# Patient Record
Sex: FEMALE
[De-identification: names, ages, dates, MRNs, and addresses within clinical notes are randomized per-mention and may not be internally consistent; named-entity substitution may affect disease eponyms.]

---

## 2017-08-13 ENCOUNTER — HOSPITAL ENCOUNTER (OUTPATIENT)
Dept: HOSPITAL 42 - ED | Age: 77
Setting detail: OBSERVATION
LOS: 1 days | Discharge: HOME | End: 2017-08-14
Attending: FAMILY MEDICINE | Admitting: FAMILY MEDICINE
Payer: MEDICARE

## 2017-08-13 VITALS — BODY MASS INDEX: 22.2 KG/M2 | HEART RATE: 71 BPM

## 2017-08-13 VITALS — RESPIRATION RATE: 20 BRPM

## 2017-08-13 DIAGNOSIS — D64.9: ICD-10-CM

## 2017-08-13 DIAGNOSIS — I42.0: ICD-10-CM

## 2017-08-13 DIAGNOSIS — Z90.710: ICD-10-CM

## 2017-08-13 DIAGNOSIS — Z88.2: ICD-10-CM

## 2017-08-13 DIAGNOSIS — I47.2: Primary | ICD-10-CM

## 2017-08-13 DIAGNOSIS — Z95.810: ICD-10-CM

## 2017-08-13 DIAGNOSIS — I49.01: ICD-10-CM

## 2017-08-13 DIAGNOSIS — Z88.1: ICD-10-CM

## 2017-08-13 DIAGNOSIS — I11.0: ICD-10-CM

## 2017-08-13 DIAGNOSIS — Z87.01: ICD-10-CM

## 2017-08-13 DIAGNOSIS — Z95.0: ICD-10-CM

## 2017-08-13 DIAGNOSIS — Z88.8: ICD-10-CM

## 2017-08-13 DIAGNOSIS — Z79.82: ICD-10-CM

## 2017-08-13 DIAGNOSIS — I50.9: ICD-10-CM

## 2017-08-13 DIAGNOSIS — K59.00: ICD-10-CM

## 2017-08-13 DIAGNOSIS — R55: ICD-10-CM

## 2017-08-13 DIAGNOSIS — I48.0: ICD-10-CM

## 2017-08-13 DIAGNOSIS — I27.2: ICD-10-CM

## 2017-08-13 LAB
ALBUMIN SERPL-MCNC: 4.2 G/DL (ref 3–4.8)
ALBUMIN/GLOB SERPL: 1.4 {RATIO} (ref 1.1–1.8)
ALT SERPL-CCNC: 40 U/L (ref 7–56)
APTT BLD: 31.7 SECONDS (ref 23.7–30.8)
AST SERPL-CCNC: 34 U/L (ref 15–39)
BASOPHILS # BLD AUTO: 0.05 K/MM3 (ref 0–2)
BASOPHILS NFR BLD: 0.8 % (ref 0–3)
BUN SERPL-MCNC: 24 MG/DL (ref 7–21)
CALCIUM SERPL-MCNC: 9.6 MG/DL (ref 8.4–10.5)
EOSINOPHIL # BLD: 0.2 10*3/UL (ref 0–0.7)
EOSINOPHIL NFR BLD: 4 % (ref 1.5–5)
ERYTHROCYTE [DISTWIDTH] IN BLOOD BY AUTOMATED COUNT: 23.5 % (ref 11.5–14.5)
GFR NON-AFRICAN AMERICAN: 44
GRANULOCYTES # BLD: 4.56 10*3/UL (ref 1.4–6.5)
GRANULOCYTES NFR BLD: 76.7 % (ref 50–68)
HGB BLD-MCNC: 11 G/DL (ref 12–16)
INR PPP: 2.21 (ref 0.93–1.08)
LYMPHOCYTES # BLD: 0.6 10*3/UL (ref 1.2–3.4)
LYMPHOCYTES NFR BLD AUTO: 10.4 % (ref 22–35)
MAGNESIUM SERPL-MCNC: 2.4 MG/DL (ref 1.7–2.2)
MCH RBC QN AUTO: 22.6 PG (ref 25–35)
MCHC RBC AUTO-ENTMCNC: 30.8 G/DL (ref 31–37)
MCV RBC AUTO: 73.5 FL (ref 80–105)
MONOCYTES # BLD AUTO: 0.5 10*3/UL (ref 0.1–0.6)
MONOCYTES NFR BLD: 8.1 % (ref 1–6)
PLATELET # BLD: 274 10^3/UL (ref 120–450)
PMV BLD AUTO: 8.6 FL (ref 7–11)
PROTHROMBIN TIME: 23.9 SECONDS (ref 9.9–11.8)
RBC # BLD AUTO: 4.86 10^6/UL (ref 3.5–6.1)
TROPONIN I SERPL-MCNC: 0.02 NG/ML
WBC # BLD AUTO: 6 10^3/UL (ref 4.5–11)

## 2017-08-13 PROCEDURE — 99285 EMERGENCY DEPT VISIT HI MDM: CPT

## 2017-08-13 PROCEDURE — 85027 COMPLETE CBC AUTOMATED: CPT

## 2017-08-13 PROCEDURE — 85025 COMPLETE CBC W/AUTO DIFF WBC: CPT

## 2017-08-13 PROCEDURE — 80053 COMPREHEN METABOLIC PANEL: CPT

## 2017-08-13 PROCEDURE — 93005 ELECTROCARDIOGRAM TRACING: CPT

## 2017-08-13 PROCEDURE — 70450 CT HEAD/BRAIN W/O DYE: CPT

## 2017-08-13 PROCEDURE — 71010: CPT

## 2017-08-13 PROCEDURE — 84484 ASSAY OF TROPONIN QUANT: CPT

## 2017-08-13 PROCEDURE — 82550 ASSAY OF CK (CPK): CPT

## 2017-08-13 PROCEDURE — 83735 ASSAY OF MAGNESIUM: CPT

## 2017-08-13 PROCEDURE — 36415 COLL VENOUS BLD VENIPUNCTURE: CPT

## 2017-08-13 PROCEDURE — 81001 URINALYSIS AUTO W/SCOPE: CPT

## 2017-08-13 PROCEDURE — 85730 THROMBOPLASTIN TIME PARTIAL: CPT

## 2017-08-13 PROCEDURE — 85610 PROTHROMBIN TIME: CPT

## 2017-08-13 PROCEDURE — 83615 LACTATE (LD) (LDH) ENZYME: CPT

## 2017-08-13 PROCEDURE — 80162 ASSAY OF DIGOXIN TOTAL: CPT

## 2017-08-13 RX ADMIN — MILRINONE LACTATE PRN MLS/HR: 200 INJECTION, SOLUTION INTRAVENOUS at 18:21

## 2017-08-13 NOTE — CARD
--------------- APPROVED REPORT --------------





EKG Measurement

Heart [id number]

AL 128P

[id number]

[id number]

[id number]



<Conclusion>



AV sequential or dual chamber electronic pacemaker

## 2017-08-13 NOTE — ED PDOC
Arrival/HPI





- General


Chief Complaint: Syncope


Time Seen by Provider: 08/13/17 12:54


Historian: Patient





- History of Present Illness


Narrative History of Present Illness (Text): 


08/13/17 13:00


A 77 year old female with a past medial history of congestive heart failure, 

hypertension, and on Coumadin, presents to the Emergency department brought via 

EMS after a syncopal episode at her [employer]. The patient states that while she 

was standing she felt dizzy and had to sit down, and "blacked out" She notes 

that she recalls being surrounded by people at [employer]. The patient stats that 

she is asymptomatic at the moment. The patient denies headache, fevers, chills, 

chest pain, abdominal pain shortness of breath, nausea, vomiting, diarrhea, or 

any other complaint. 





PMD: Dr. Shantel Ventura 


08/13/17 17:00





Time/Duration: Prior to Arrival


Symptom Onset: Sudden


Symptom Course: Improving


Activities at Onset: Rest, Light


Context: Other (Jewish)





Past Medical History





- Provider Review


Nursing Documentation Reviewed: Yes





- Infectious Disease


Hx of Infectious Diseases: None





- Tetanus Immunization


Tetanus Immunization: Unknown





- Cardiac


Hx Cardiac Disorders: Yes (CHF)


Hx Cardiac Arrhythmia: Yes (AFIB)


Hx Congestive Heart Failure: Yes


Hx Internal Defibrillator: Yes (2 years ago)


Hx Pacemaker: Yes (2 years ago)





- Pulmonary


Hx Respiratory Disorders: Yes


Hx Pneumonia: Yes





- Neurological


Hx Neurological Disorder: No





- HEENT


Hx HEENT Disorder: No





- Renal


Hx Renal Disorder: No





- Endocrine/Metabolic


Hx Endocrine Disorders: No





- Hematological/Oncological


Hx Blood Disorders: No


Hx Anemia: Yes (HAD BLOOD TRANFUSION)





- Integumentary


Hx Dermatological Disorder: No





- Musculoskeletal/Rheumatological


Hx Musculoskeletal Disorders: No


Hx Falls: No





- Gastrointestinal


Hx Gastrointestinal Disorders: No





- Genitourinary/Gynecological


Hx Genitourinary Disorders: No





- Psychiatric


Hx Psychophysiologic Disorder: No


Hx Depression: No


Hx Emotional Abuse: No


Hx Physical Abuse: No


Hx Substance Use: No





- Surgical History


Hx Hysterectomy: Yes (AT AGE 39 YEARS AGO)


Other/Comment: HYSTERECTOMY.PACEMAKER,DEFIBRILLATOR





- Anesthesia


Hx Anesthesia: Yes


Hx Anesthesia Reactions: No


Hx Malignant Hyperthermia: No





- Suicidal Assessment


Feels Threatened In Home Enviroment: No





Family/Social History





- Physician Review


Nursing Documentation Reviewed: Yes


Family/Social History: No Known Family HX


Smoking Status: Never Smoked


Hx Alcohol Use: No


Hx Substance Use: No


Hx Substance Use Treatment: No





Allergies/Home Meds


Allergies/Adverse Reactions: 


Allergies





iodine Allergy (Verified 02/11/16 20:14)


 RASH


levofloxacin Allergy (Verified 02/11/16 20:14)


 RASH


sulfur [From Sulfur-8] Allergy (Verified 02/11/16 20:14)


 RASH








Home Medications: 


 Home Meds











 Medication  Instructions  Recorded  Confirmed


 


Digoxin 0.125 mg PO DAILY 10/22/12 08/13/17


 


Furosemide 40 mg PO QAM 10/22/12 08/13/17


 


Sotalol Hydrochloride [Sotalol] 80 mg PO BID 10/22/12 08/13/17


 


Furosemide [Lasix] 40 mg PO HS 08/25/14 08/13/17


 


Aspirin [Adult Low Dose Aspirin EC] 120 mg PO DAILY 02/11/16 08/13/17














Review of Systems





- Physician Review


All systems were reviewed & negative as marked: Yes





- Review of Systems


Constitutional: absent: Fevers, Night Sweats


Respiratory: absent: SOB


Cardiovascular: absent: Chest Pain


Gastrointestinal: absent: Abdominal Pain, Diarrhea, Nausea, Vomiting


Neurological: Dizziness, Other (Syncopal Episode).  absent: Headache





Physical Exam


Vital Signs











  Temp Pulse Resp BP Pulse Ox


 


 08/13/17 16:40   70  18  96/60 L  99


 


 08/13/17 15:07   70  15  91/56 L  100


 


 08/13/17 15:03  97.6 F  70  18  85/46 L 


 


 08/13/17 13:58  97.6 F  70   85/46 L  100


 


 08/13/17 12:55   70  18  92/45 L  99











Appearance: Positive for: Well-Appearing, Non-Toxic, Comfortable


Pain Distress: None


Mental Status: Positive for: Alert and Oriented X 3





- Systems Exam


Head: Present: Atraumatic, Normocephalic


Pupils: Present: PERRL


Extroacular Muscles: Present: EOMI


Conjunctiva: Present: Normal


Mouth: Present: Moist Mucous Membranes


Neck: Present: Normal Range of Motion


Respiratory/Chest: Present: Clear to Auscultation, Good Air Exchange.  No: 

Respiratory Distress, Accessory Muscle Use


Cardiovascular: Present: Regular Rate and Rhythm, Normal S1, S2.  No: Murmurs


Abdomen: Present: Normal Bowel Sounds.  No: Tenderness, Distention, Peritoneal 

Signs


Back: Present: Normal Inspection


Upper Extremity: Present: Normal Inspection.  No: Cyanosis, Edema


Lower Extremity: Present: Normal Inspection.  No: Edema


Neurological: Present: GCS=15, CN II-XII Intact, Speech Normal


Skin: Present: Warm, Dry, Normal Color.  No: Rashes


Psychiatric: Present: Alert, Oriented x 3, Normal Insight, Normal Concentration





Medical Decision Making


ED Course and Treatment: 


08/13/17 13:15


Impression:


A 77 year old female with an episode of dizziness and syncope prior to arrival. 

r/o cardiac metabolic intracrnial etiology





Plan:


-- EKG


-- Head CT


-- Chest X-ray


-- Urinalysis 


-- Labs 


-- Reassess and disposition





Prior Visits:


Notes and results from previous visits were reviewed.





Progress Notes:


EKG:


Ordered, reviewed, and independently interpreted the EKG.


Rate : 70 BPM


Rhythm : Paced





CHEST X-RAY 


Dictator : Db Liao MD


Report Date : 08/13/2017 13:51:50


IMPRESSION: No active disease. No significant interval change compared to the 

prior examination(s).





CT HEAD WITHOUT CONTRAST


Dictator : Db Liao MD


Report Date : 08/13/2017 14:10:07


IMPRESSION: No acute intracranial abnormalities. No significant findings to 

account for the clinical presentation.





08/13/17 14:14


Pt remains well appearing, smiling in and in ER. Neuro intact. will admit for 

syncope, r/o cardiac etiology. Dr. Pemberton accepts. [place] syncope 

positive needs admission.











- Lab Interpretations


Lab Results: 








 08/13/17 13:25 





 08/13/17 13:25 





 Lab Results





08/13/17 13:25: Sodium 139, Potassium 4.5, Chloride 103, Carbon Dioxide 27, 

Anion Gap 14, BUN 24 H, Creatinine 1.2, Est GFR (African Amer) 53, Est GFR (Non-

Af Amer) 44, Random Glucose 113 H, Calcium 9.6, Magnesium 2.4 H, Total 

Bilirubin 0.6, AST 34, ALT 40, Alkaline Phosphatase 100, Lactate Dehydrogenase 

526, Total Creatine Kinase 83, Troponin I 0.02  D, Total Protein 7.2, Albumin 

4.2, Globulin 3.0, Albumin/Globulin Ratio 1.4


08/13/17 13:25: PT 23.9 H, INR 2.21 H, APTT 31.7 H


08/13/17 13:25: WBC 6.0, RBC 4.86, Hgb 11.0 L, Hct 35.7 L, MCV 73.5 L, MCH 22.6 

L, MCHC 30.8 L, RDW 23.5 H, Plt Count 274, MPV 8.6, Gran % 76.7 H, Lymph % (Auto

) 10.4 L, Mono % (Auto) 8.1 H, Eos % (Auto) 4.0, Baso % (Auto) 0.8, Gran # 4.56

, Lymph # 0.6 L, Mono # 0.5, Eos # 0.2, Baso # 0.05


08/13/17 13:25: Digoxin < 0.4 L








I have reviewed the lab results: Yes





- RAD Interpretation


Radiology Orders: 








08/13/17 13:02


HEAD W/O CONTRAST [CT] Stat 


CHEST PORTABLE [RAD] Stat 














- EKG Interpretation


Interpreted by ED Physician: Yes


Type: 12 lead EKG





- Medication Orders


Current Medication Orders: 








Amiodarone HCl (Cordarone)  200 mg PO DAILY [facility]


Carvedilol (Coreg)  3.125 mg PO BID [facility]


Home Med (Home Med)  1 unit PO DAILY [facility]


Milrinone Lactate/Dextrose (Primacor 20mg/100ml D5w)  100 mls @ 5.797 mls/hr IV 

.[id number] PRN; Protocol; 0.3 MCG/KG/MIN


   PRN Reason: TITRATE PER MD ORDER


Spironolactone (Aldactone)  12.5 mg PO BID [facility]











- Scribe Statement


The provider has reviewed the documentation as recorded by the Aure Ortiz





Provider Scribe Attestation:


All medical record entries made by the Carlitosibkendy were at my direction and 

personally dictated by me. I have reviewed the chart and agree that the record 

accurately reflects my personal performance of the history, physical exam, 

medical decision making, and the department course for this patient. I have 

also personally directed, reviewed, and agree with the discharge instructions 

and disposition.








Disposition/Present on Arrival





- Present on Arrival


Any Indicators Present on Arrival: No


History of DVT/PE: No


History of Uncontrolled Diabetes: No


Urinary Catheter: No


History of Decub. Ulcer: No


History Surgical Site Infection Following: None





- Disposition


Have Diagnosis and Disposition been Completed?: Yes


Diagnosis: 


 Syncope





Disposition: HOSPITALIZED


Disposition Time: 17:12


Condition: FAIR

## 2017-08-13 NOTE — RAD
HISTORY:

Syncope.



COMPARISON:

12/14/2016. 



FINDINGS:



LUNGS:

No active pulmonary disease.



PLEURA:

No significant pleural effusion identified, no pneumothorax apparent.



CARDIOVASCULAR:

Cardiomegaly.  No evidence of acute, significant cardiovascular 

disease. Position/ configuration of pacemaker

Satisfactory. PICC line in satisfactory position unchanged compared 

to prior study.



OSSEOUS STRUCTURES:

No significant abnormalities.



VISUALIZED UPPER ABDOMEN:

Normal.



OTHER FINDINGS:

None.



IMPRESSION:

No active disease. No significant interval change compared to the 

prior examination(s).

## 2017-08-13 NOTE — CT
PROCEDURE:  CT HEAD WITHOUT CONTRAST.



HISTORY:

Syncope. 



COMPARISON:

None available. 



TECHNIQUE:

Axial computed tomography images were obtained through the head/brain 

without intravenous contrast.  



Radiation dose:



Total exam DLP = 725.84 mGy-cm.



This CT exam was performed using one or more of the following dose 

reduction techniques: Automated exposure control, adjustment of the 

mA and/or kV according to patient size, and/or use of iterative 

reconstruction technique.



FINDINGS:



HEMORRHAGE:

No intracranial hemorrhage. 



BRAIN:

No mass effect or edema.  Cerebellar atrophy.



VENTRICLES:

Unremarkable. No hydrocephalus. 



CALVARIUM:

Unremarkable.



PARANASAL SINUSES:

Chronic ethmoid air cell disease.



MASTOID AIR CELLS:

Unremarkable as visualized. No inflammatory changes.



OTHER FINDINGS:

None.



IMPRESSION:

No acute intracranial abnormalities. No significant findings to 

account for the clinical presentation.

## 2017-08-14 VITALS — TEMPERATURE: 98.2 F | SYSTOLIC BLOOD PRESSURE: 109 MMHG | HEART RATE: 67 BPM | DIASTOLIC BLOOD PRESSURE: 60 MMHG

## 2017-08-14 VITALS — OXYGEN SATURATION: 93 %

## 2017-08-14 LAB
ALBUMIN SERPL-MCNC: 3.7 G/DL (ref 3–4.8)
ALBUMIN/GLOB SERPL: 1.3 {RATIO} (ref 1.1–1.8)
ALT SERPL-CCNC: 33 U/L (ref 7–56)
APPEARANCE UR: CLEAR
AST SERPL-CCNC: 32 U/L (ref 15–39)
BILIRUB UR-MCNC: NEGATIVE MG/DL
BUN SERPL-MCNC: 18 MG/DL (ref 7–21)
CALCIUM SERPL-MCNC: 8.9 MG/DL (ref 8.4–10.5)
COLOR UR: YELLOW
EPI CELLS #/AREA URNS HPF: (no result) /HPF (ref 0–5)
ERYTHROCYTE [DISTWIDTH] IN BLOOD BY AUTOMATED COUNT: 21.6 % (ref 11.5–14.5)
GFR NON-AFRICAN AMERICAN: > 60
GLUCOSE UR STRIP-MCNC: NEGATIVE MG/DL
HGB BLD-MCNC: 9.3 G/DL (ref 12–16)
LEUKOCYTE ESTERASE UR-ACNC: (no result) LEU/UL
MCH RBC QN AUTO: 22.5 PG (ref 25–35)
MCHC RBC AUTO-ENTMCNC: 30.4 G/DL (ref 31–37)
MCV RBC AUTO: 74.1 FL (ref 80–105)
PH UR STRIP: 6 [PH] (ref 4.7–8)
PLATELET # BLD: 237 10^3/UL (ref 120–450)
PMV BLD AUTO: 9.1 FL (ref 7–11)
PROT UR STRIP-MCNC: NEGATIVE MG/DL
RBC # BLD AUTO: 4.13 10^6/UL (ref 3.5–6.1)
RBC # UR STRIP: (no result) /UL
RBC #/AREA URNS HPF: (no result) /HPF (ref 0–2)
SP GR UR STRIP: 1.01 (ref 1–1.03)
URINE NITRATE: NEGATIVE
UROBILINOGEN UR STRIP-ACNC: 1 E.U./DL
WBC # BLD AUTO: 5.4 10^3/UL (ref 4.5–11)
WBC #/AREA URNS HPF: (no result) /HPF (ref 0–6)

## 2017-08-14 RX ADMIN — MILRINONE LACTATE PRN MLS/HR: 200 INJECTION, SOLUTION INTRAVENOUS at 08:01

## 2017-08-14 NOTE — CON
DATE:  08/14/2017



INDICATIONS:  Syncope.



HISTORY OF PRESENT ILLNESS:  This is a 77-year-old woman, well known to me,

admitted after a syncopal episode which occurred in [employer] yesterday

morning.  She felt like her vision was dimming, she felt weak and then

passed out briefly.  She was observed to shake.  She does not recall if got

a shock or not, but surmises that she must have because she woke up very

quickly.  There was chest pain, shortness of breath, orthopnea, PND,

vertigo, palpitation, edema, fever, chills, cough, sputum production,

hemoptysis, abdominal pain, nausea, vomiting, diarrhea, constipation or

melena.



PAST MEDICAL HISTORY:  Notable for dilated cardiomyopathy with class IV CHF

on home milrinone infusion and recently Entresto .  She is followed by Dr. Lugo at the [facility].

She has a defibrillator.  She has a history pulmonary hypertension,

paroxysmal atrial fibrillation, hypertension, pneumonia and a hysterectomy.

There is no history of rheumatic fever, myocardial infarction, angina,

diabetes, stroke, TIA or gout.

 

MEDICATIONS AT THE TIME OF ADMISSION:  Include aspirin, digoxin, Lasix,

warfarin, Coreg, amiodarone, and milrinone.  Recently milrinone has been

weaned slowly while she has been started on Entresto by Dr. Lugo.  She

currently takes Entresto 49/51 one tablet b.i.d.



ALLERGIES:  SHE NOTES ALLERGIES TO IODINE, LEVOFLOXACIN AND SULFA

MEDICATIONS.



SOCIAL HISTORY:  She lives at home.  She is ambulatory.  She is retired. 

She does not smoke cigarettes.  She does not drink alcohol.



FAMILY HISTORY:  Noncontributory.



REVIEW OF SYSTEMS:  Ten point review of systems otherwise unremarkable

except as noted above.



PHYSICAL EXAMINATION

GENERAL:  She is a well-developed woman, lying in bed on telemetry in no

acute distress.

VITAL SIGNS:  Notable for AV pacing at 72 to 81 beats per minute.  She is

afebrile.  Blood pressure 100/59, respirations 20, O2 saturation 93% to 99%

on room air.  Orthostatic blood pressures are normal.

HEENT:  Reveals moderate neck vein distention.  No thyromegaly.  No carotid

bruit.  Mucous membranes moist.  Conjunctivae pink.

NECK:  Supple.

LUNGS:  Fields clear throughout.

HEART:  Revealed normal 1st and 2nd heart sounds.  There is soft systolic

murmur along the left sternal border.  The PMI is displaced laterally.

ABDOMEN:  Soft.  Bowel sounds are present.  There is no mass, organomegaly,

tenderness, rebound or guarding.  No CVA tenderness.  No palpable abdominal

aortic aneurysm.

EXTREMITIES:  Revealed no cyanosis, clubbing or edema.

NEUROLOGIC:  She is awake, alert and oriented.

SKIN:  Warm and dry.  No rash or cellulitis.

PSYCHIATRIC:  Normal as to mood and affect.



LABORATORY DATA AND IMAGING:  A portable chest x-ray revealed no active

disease.  EKG reveals AV pacing.  A CT scan of the head reveals no acute

intracranial abnormality.  White count normal.  Platelet count normal. 

Hemoglobin 11, repeat 9.3.  Hematocrit 35.7, repeat 30.6.  PT 23.9.  INR

2.21.  Electrolytes, BUN, creatinine, blood sugar are unremarkable. 

Magnesium 2.4.  LFTs unremarkable.  CK 83, troponin 0.02.  Urinalysis is

noted, digoxin level less than 0.4.



IMPRESSION:  Crystal Roger is a 77-year-old woman with known dilated

cardiomyopathy, on home milrinone and p.o. Entresto with known paroxysmal

atrial fibrillation and an ICD in place, who experienced syncope while at

[employer].  This could have been an of episode of VT or VF terminated by the

defibrillator.  We will interrogate the defibrillator later today to

ascertain the rhythm at the time of her syncopal episode yesterday.  In the

meantime, she has been on telemetry, has had no further arrhythmias and she

feels well without recurrent syncope or dizziness or palpitations.



PLAN:  Based on the interrogation of defibrillator, we may increase her

amiodarone to 200 mg b.i.d.  In the meantime, we will continue her usual

medications including aspirin, spironolactone, amiodarone, Coreg, 

warfarin, digoxin, Lasix, and milrinone.  Since Entresto is not our

formulary, she can take her home medications at 49/51 mg one tablet b.i.d. 

Once the defibrillator is interrogated, we will plan for discharge probably

later today.  Outpatient followup in my office as well as with Dr. Lugo and 
Dr. Estrada (). 

She will report any further shocks or syncope to us promptly.





________________________________________

Balwinder Infante MD



DD:  08/14/2017 8:42:13

DT:  08/14/2017 11:02:26

Job # [id number]





SHERYL

## 2017-08-14 NOTE — HP
CHIEF COMPLAINT:  Syncope.



HISTORY OF PRESENT ILLNESS:  A 77-years-old female with history of dilated

cardiomyopathy with very low ejection fraction of 15%.  Currently on

chronic medications for congestive heart failure, which is Entresto and

milrinone .  The patient was in her good state of health.  She went to

[place] in the morning when she felt lightheaded and eventually became

diaphoretic and passed out.  The patient was brought to the emergency room.

She denied any chest pain, shortness of breath, fever, headache, or blurry

vision.



PAST MEDICAL HISTORY:  Dilated cardiomyopathy with congestive heart

failure, ejection fraction of 15% chronically on milrinone, Entresto.



SURGICAL HISTORY:  Significant for hysterectomy and pacemaker with

defibrillator insertion in 2011.



CURRENT MEDICATIONS:  Spironolactone, aspirin, furosemide, warfarin,

carvedilol, amiodarone, Entresto and milrinone.



ALLERGIES:  THE PATIENT HAS KNOWN ALLERGIES TO HEPARIN, IODINE,

LEVOFLOXACIN AND SULFA.



FAMILY HISTORY:  Significant for heart disease on father side.  The patient

has 2 children who are heathy.



SOCIAL HISTORY:  The patient denies any smoking or alcohol use.  The

patient denies any drug use.  She is a .  The patient lives with her

daughter.  The patient is ambulatory and independent with activities of

daily living.



REVIEW OF SYSTEMS:  The patent denies any fever, loss of appetite, loss of

weight.  She denies any sore throat, nasal congestion.  She denies any

dysphagia.  Denies any cough or shortness of breath.  She denies any heart

palpitation, chest pain.  She denies any leg edema.  The patient denies any

abdominal symptoms like nausea, vomiting, diarrhea, constipation.  She

denies any dysuria, hematuria or frequency.  Denies any neurological

symptoms as headache, blurred vision, numbness, weakness.  The patient

denies any depression symptoms or anxiety, but has chronic insomnia.



PHYSICAL EXAMINATION:

GENERAL:  She is comfortably bed alert, awake, and oriented.

VITAL SIGNS:  Stable, temperature this morning 97.9 and pulse 72 regular,

blood pressure 112/63, respiratory rate 20 and O2 saturation since

yesterday range of between 99% to 95%.

HEENT:  Head is normocephalic and atraumatic.  Oral mucosa is moist.

NECK:  Supple.  No neck mass, no JVD.

LUNGS:  Crackles in the base.  No rales, or rhonchi.

HEART:  With regular rate, rate of 70 per minute.

ABDOMEN:  Soft, nontender, nondistended.  No masses palpable.  Bowel sounds

positive.

EXTREMITIES:  No edema with full range of motion.

NEUROLOGICAL EXAM:  Normal, no sensory motor deficits.



DIAGNOSTIC TESTS:  CBC on admission showed normal. WBC 6, hemoglobin 11,

hematocrit 35.7.  Repeated CBC this morning showed stable WBC 5.4,

hemoglobin 9.3, hematocrit 30.6, platelet count 237.  Chemistry was normal

with random glucose 115, magnesium 2.4, albumin 124, creatinine 1.2.  This

morning BUN 18, creatinine 0.9.  Urinalysis showed trace of blood.  Her

chest x-ray showed no active disease.  EKG showed heart rate of 70 with

electronic pacemaker rhythm.  Head CT showed no intracerebral bleeding.



ASSESSMENT:

1.  A 77-year-old female with history of dilated cardiomyopathy and CHF

admitted for a syncopal episode most probably vasovagal reflex.

2.  Chronic dilated cardiomyopathy, hemodynamically stable with no acute

CHF.

3.  Mild anemia chronic with no signs of active bleeding.



PLAN OF TREATMENT:  The patient was admitted to telemetry for monitoring. 

Cardiologist was called in consult.  The patient was restarted on chronic

medications and the patient will be monitored, her pacemaker will be

checked.





__________________________________________

Shantel Franklin MD





DD:  08/14/2017 9:19:42

DT:  08/14/2017 12:47:24

Job # [id number]





MTDD

## 2017-12-30 ENCOUNTER — HOSPITAL ENCOUNTER (OUTPATIENT)
Dept: HOSPITAL 42 - ED | Age: 77
Setting detail: OBSERVATION
LOS: 1 days | Discharge: HOME | End: 2017-12-31
Attending: FAMILY MEDICINE | Admitting: FAMILY MEDICINE
Payer: MEDICARE

## 2017-12-30 VITALS — BODY MASS INDEX: 23 KG/M2 | HEART RATE: 71 BPM

## 2017-12-30 DIAGNOSIS — I48.0: ICD-10-CM

## 2017-12-30 DIAGNOSIS — I42.0: ICD-10-CM

## 2017-12-30 DIAGNOSIS — D50.9: ICD-10-CM

## 2017-12-30 DIAGNOSIS — Z95.810: ICD-10-CM

## 2017-12-30 DIAGNOSIS — I27.20: ICD-10-CM

## 2017-12-30 DIAGNOSIS — I11.0: Primary | ICD-10-CM

## 2017-12-30 DIAGNOSIS — Z90.710: ICD-10-CM

## 2017-12-30 DIAGNOSIS — Z87.01: ICD-10-CM

## 2017-12-30 DIAGNOSIS — I50.23: ICD-10-CM

## 2017-12-30 DIAGNOSIS — N28.9: ICD-10-CM

## 2017-12-30 LAB
ALBUMIN SERPL-MCNC: 4 G/DL (ref 3–4.8)
ALBUMIN/GLOB SERPL: 1.3 {RATIO} (ref 1.1–1.8)
ALT SERPL-CCNC: 35 U/L (ref 7–56)
APTT BLD: 33.4 SECONDS (ref 25.1–36.5)
AST SERPL-CCNC: 36 U/L (ref 14–36)
BASOPHILS # BLD AUTO: 0.04 K/MM3 (ref 0–2)
BASOPHILS NFR BLD: 0.7 % (ref 0–3)
BUN SERPL-MCNC: 30 MG/DL (ref 7–21)
CALCIUM SERPL-MCNC: 9.2 MG/DL (ref 8.4–10.5)
EOSINOPHIL # BLD: 0.1 10*3/UL (ref 0–0.7)
EOSINOPHIL NFR BLD: 1.5 % (ref 1.5–5)
ERYTHROCYTE [DISTWIDTH] IN BLOOD BY AUTOMATED COUNT: 17.9 % (ref 11.5–14.5)
GFR NON-AFRICAN AMERICAN: 40
GRANULOCYTES # BLD: 5.06 10*3/UL (ref 1.4–6.5)
GRANULOCYTES NFR BLD: 83.9 % (ref 50–68)
HGB BLD-MCNC: 8.4 G/DL (ref 12–16)
INR PPP: 3.5 (ref 0.93–1.08)
LYMPHOCYTES # BLD: 0.5 10*3/UL (ref 1.2–3.4)
LYMPHOCYTES NFR BLD AUTO: 7.6 % (ref 22–35)
MCH RBC QN AUTO: 21.4 PG (ref 25–35)
MCHC RBC AUTO-ENTMCNC: 29.6 G/DL (ref 31–37)
MCV RBC AUTO: 72.4 FL (ref 80–105)
MONOCYTES # BLD AUTO: 0.4 10*3/UL (ref 0.1–0.6)
MONOCYTES NFR BLD: 6.3 % (ref 1–6)
PLATELET # BLD: 332 10^3/UL (ref 120–450)
PMV BLD AUTO: 8.9 FL (ref 7–11)
PROTHROMBIN TIME: 39.1 SECONDS (ref 9.4–12.5)
RBC # BLD AUTO: 3.92 10^6/UL (ref 3.5–6.1)
TROPONIN I SERPL-MCNC: 0.04 NG/ML
WBC # BLD AUTO: 6 10^3/UL (ref 4.5–11)

## 2017-12-30 PROCEDURE — 83880 ASSAY OF NATRIURETIC PEPTIDE: CPT

## 2017-12-30 PROCEDURE — 86850 RBC ANTIBODY SCREEN: CPT

## 2017-12-30 PROCEDURE — 82550 ASSAY OF CK (CPK): CPT

## 2017-12-30 PROCEDURE — 80053 COMPREHEN METABOLIC PANEL: CPT

## 2017-12-30 PROCEDURE — 96375 TX/PRO/DX INJ NEW DRUG ADDON: CPT

## 2017-12-30 PROCEDURE — 85730 THROMBOPLASTIN TIME PARTIAL: CPT

## 2017-12-30 PROCEDURE — 94640 AIRWAY INHALATION TREATMENT: CPT

## 2017-12-30 PROCEDURE — 85610 PROTHROMBIN TIME: CPT

## 2017-12-30 PROCEDURE — 85027 COMPLETE CBC AUTOMATED: CPT

## 2017-12-30 PROCEDURE — 86920 COMPATIBILITY TEST SPIN: CPT

## 2017-12-30 PROCEDURE — 96374 THER/PROPH/DIAG INJ IV PUSH: CPT

## 2017-12-30 PROCEDURE — 80162 ASSAY OF DIGOXIN TOTAL: CPT

## 2017-12-30 PROCEDURE — 71010: CPT

## 2017-12-30 PROCEDURE — 93005 ELECTROCARDIOGRAM TRACING: CPT

## 2017-12-30 PROCEDURE — 85044 MANUAL RETICULOCYTE COUNT: CPT

## 2017-12-30 PROCEDURE — 96376 TX/PRO/DX INJ SAME DRUG ADON: CPT

## 2017-12-30 PROCEDURE — 85025 COMPLETE CBC W/AUTO DIFF WBC: CPT

## 2017-12-30 PROCEDURE — 84484 ASSAY OF TROPONIN QUANT: CPT

## 2017-12-30 PROCEDURE — 36415 COLL VENOUS BLD VENIPUNCTURE: CPT

## 2017-12-30 PROCEDURE — 36430 TRANSFUSION BLD/BLD COMPNT: CPT

## 2017-12-30 PROCEDURE — 83615 LACTATE (LD) (LDH) ENZYME: CPT

## 2017-12-30 PROCEDURE — 86900 BLOOD TYPING SEROLOGIC ABO: CPT

## 2017-12-30 PROCEDURE — 99285 EMERGENCY DEPT VISIT HI MDM: CPT

## 2017-12-31 VITALS — DIASTOLIC BLOOD PRESSURE: 69 MMHG | SYSTOLIC BLOOD PRESSURE: 111 MMHG | TEMPERATURE: 99 F | HEART RATE: 79 BPM

## 2017-12-31 VITALS — RESPIRATION RATE: 20 BRPM

## 2017-12-31 VITALS — OXYGEN SATURATION: 97 %

## 2017-12-31 LAB
ALBUMIN SERPL-MCNC: 4.1 G/DL (ref 3–4.8)
ALBUMIN/GLOB SERPL: 1.4 {RATIO} (ref 1.1–1.8)
ALT SERPL-CCNC: 42 U/L (ref 7–56)
AST SERPL-CCNC: 35 U/L (ref 14–36)
BUN SERPL-MCNC: 27 MG/DL (ref 7–21)
CALCIUM SERPL-MCNC: 9.6 MG/DL (ref 8.4–10.5)
ERYTHROCYTE [DISTWIDTH] IN BLOOD BY AUTOMATED COUNT: 17.8 % (ref 11.5–14.5)
GFR NON-AFRICAN AMERICAN: 44
HGB BLD-MCNC: 8.5 G/DL (ref 12–16)
INR PPP: 3.34 (ref 0.93–1.08)
MCH RBC QN AUTO: 21.6 PG (ref 25–35)
MCHC RBC AUTO-ENTMCNC: 29.9 G/DL (ref 31–37)
MCV RBC AUTO: 72.3 FL (ref 80–105)
PLATELET # BLD: 343 10^3/UL (ref 120–450)
PMV BLD AUTO: 8.9 FL (ref 7–11)
PROTHROMBIN TIME: 37.6 SECONDS (ref 9.4–12.5)
RBC # BLD AUTO: 3.93 10^6/UL (ref 3.5–6.1)
TOTAL NUMBER OF RETICS COUNTED: 0 (ref 0.5–4)
WBC # BLD AUTO: 6.1 10^3/UL (ref 4.5–11)

## 2017-12-31 RX ADMIN — MILRINONE LACTATE SCH MLS/HR: 200 INJECTION, SOLUTION INTRAVENOUS at 00:53

## 2017-12-31 RX ADMIN — MILRINONE LACTATE SCH MLS/HR: 200 INJECTION, SOLUTION INTRAVENOUS at 07:39

## 2017-12-31 NOTE — CARD
--------------- APPROVED REPORT --------------





EKG Measurement

Heart [id number]

SC 160P45

[id number]

[id number]

[id number]



<Conclusion>

Atrial sensed,  ventricular paced rhythm

## 2017-12-31 NOTE — RAD
HISTORY:

sob  



COMPARISON:

Comparison chest 12/06/2017 



FINDINGS:

No change right-sided PICC line with tip in the SVC. 



LUNGS:

Increased vascularity suggesting mild chronic compensated pulmonary 

edema/CHF. Bibasilar opacities could represent developing 

alveolar-type infiltrates.  Possible small left-sided effusion and 

questionable tiny right effusion.



PLEURA:

As above.  No apparent pneumothorax apparent.



CARDIOVASCULAR:

Marked cardiomegaly. No change multi lead pacemaker -defibrillator. .



OSSEOUS STRUCTURES:

No significant abnormalities.



VISUALIZED UPPER ABDOMEN:

Normal.



OTHER FINDINGS:

None.



IMPRESSION:

Increased vascularity suggesting mild chronic compensated pulmonary 

edema/CHF. Bibasilar opacities could represent developing 

alveolar-type infiltrates.  Possible small left-sided effusion and 

questionable tiny right effusion. 



Marked cardiomegaly.

## 2018-01-01 NOTE — CON
DATE:  2017



REQUESTING PHYSICIAN:  Dr. Pemberton.



REASON FOR CONSULTATION:  Dyspnea.



HISTORY OF PRESENT ILLNESS:  This is a 77-year-old woman well known to us

with a history of severe dilated cardiomyopathy, maintained on home

milrinone infusion, who was admitted with worsening dyspnea, weight gain

and edema.  She is followed by the Heart Failure Team at [facility] and had recent blood work performed.  She was noted to have

an elevated BNP and in association with her symptoms was advised admission.

She is treated with IV Lasix in emergency room and feel significantly

better.  Of note, her hemoglobin on presentation was 8.4, which is down

several grams compared to blood work of 2017.  She denies any

lightheadedness or syncope.  She has had no defibrillator firings.  She has

undergone prior cardiac catheterization and was found to have normal

coronary arteries with an ejection fraction of 20%.  She has had paroxysmal

atrial fibrillation prior to defibrillator implant.  She claims compliance

with the medications as well as the sodium restriction at home.



PAST MEDICAL HISTORY:  Notable for a prior hysterectomy.



MEDICATIONS AT HOME:  Include intervenous milrinone, Aldactone, amiodarone,

Coumadin, Lasix 40 mg in the morning and 20 mg in the evening, Protonix and

Toprol-XL 25 mg daily.



ALLERGIES:  SHE HAS HAD REACTION TO IODINE IN THE PAST AS WELL AS HEPARIN,

SULFA AND LEVAQUIN.



SOCIAL HISTORY:  She does not smoke or drink.



FAMILY HISTORY:  Both parents are  from age-related illness.



REVIEW OF SYSTEMS:  A 10-point review of systems is otherwise unremarkable.



PHYSICAL EXAMINATION:

GENERAL:  She is a well-developed appearing middle-aged woman.

VITAL SIGNS:  Her blood pressure is 106/68 with a pulse of 70 with

dual-chamber pacing noted, her respirations are 16, she is afebrile.

HEENT:  Normocephalic and atraumatic.

NECK:  Supple.  No JVD noted.

CHEST:  Bibasilar rales heard.

HEART:  PMI displaced laterally with soft tones noted and systolic murmur

is present in lower left sternal border as well to the apex.

ABDOMEN:  Soft and nontender.  Normoactive bowel sounds.

EXTREMITIES:  Trace ankle edema.

SKIN:  Warm and dry.

PSYCHIATRIC:  Normal mood and affect.

NEUROLOGIC:  Alert and oriented x3.  No gross motor or sensory is

appreciable.



DIAGNOSTIC DATA:  White count is 6.0, hemoglobin and hematocrit are 8.4 and

28.4 with platelet count of 332,000, MCV is 72.4.  INR is 3.5.  Potassium

4.4, BUN and creatinine are 30 and 1.3.  BNP is 10,100.  Troponin is 0.04. 

Digoxin level is not detected.



IMPRESSION:

1.  Decompensated congestive heart failure, acute on chronic, primarily

systolic.

2.  Severe left ventricular systolic dysfunction with Class III symptoms.

3.  Progressive anemia, microcytic, etiology uncertain.

4.  Status post implantable cardioverter-defibrillator implant.

5.  Rest of problems as noted.



RECOMMENDATIONS:  Her oral medications will be resumed at this time.  Stool

guaiac will be checked.  Consideration may need to be given to transfusion

given her symptoms, to relieve anemia and severe underlying heart failure.



Thank you for this consultation.  We will be happy to follow along through

her hospital course and make further recommendations as appropriate.





__________________________________________

Hari Rosado MD





DD:  2017 11:22:06

DT:  2017 12:39:01

Job # [id number]

## 2018-01-02 NOTE — HP
CHIEF COMPLAINT:  Progressive shortness of breath.



HISTORY OF PRESENT ILLNESS:  A 77-year-old female with history of dilated

cardiomyopathy and stage IV congestive heart failure who is chronically on

milrinone IV at home, presented to Emergency Room with complaints of

progressive shortness of breath and increase of feet edema over the last 2

days.  The patient was on a increased dose of Lasix since Friday after she

had phone calls from nurse from [facility] that her BNP was elevated;

however, the increased dose of Lasix did not help and she decided to come

to Emergency Room.  She denied any chest pain, dizziness, syncope, and

heart palpitation.



PAST MEDICAL HISTORY:  History of dilated cardiomyopathy, stage IV

congestive heart failure; history of pulmonary hypertension, status post

pacemaker and defibrillator insertion; history of pneumonia.Chronic iron 
deficiency anemia.



PAST SURGICAL HISTORY:  Significant for hysterectomy, pacemaker with

defibrillator insertion in 2011.



PRESENT MEDICATIONS:  Spirolactone, Coumadin, furosemide, amiodarone,

carvedilol, Entresto,  milrinone.



ALLERGIES:  THE PATIENT HAS KNOWN ALLERGIES TO HEPARIN, IODINE, LEVAQUIN,

AND SULFA.



FAMILY HISTORY:  Significant for heart disease in father side.  The patient

has 2 children which are healthy.



SOCIAL HISTORY:  The patient denies any smoking, alcohol or drug use.  The

patient is a .  She lives with her daughter.  The patient is

ambulatory and independent of activities of daily living.



REVIEW OF SYSTEMS:  She denies any fever, loss of appetite, weight loss. 

Denies any sore throat, nasal congestion, dysphagia.  She denies any cough,

but complaints of exertional shortness of breath.  She denies any heart

palpitation or chest pain.  She complains of intermittent leg edema. 

Denies any abdominal pain, nausea, vomiting, diarrhea, or constipation. 

She denies any melena or hematemesis.  She denies any dysuria, hematuria or

flank pain.  She denies any neurological symptoms as dizziness, blurry

vision, weakness, or paraesthesia.  She denies any joint pains, swelling,

or erythema.



PHYSICAL EXAMINATION:

VITAL SIGNS:  Stable.  Temperature 97.6, heart rate 88 regular, blood

pressure 105/68, respiratory rate 18, and oxygen saturation 97% on room

air.

GENERAL:  She is pale, but in no acute form of distress.

HEENT:  Head is normocephalic, atraumatic.  Eyes with pupils reactive to

light.  Conjunctivae clear.  No jaundice.  Oral mucosa is moist.  Throat

with no lesions.

NECK:  Supple.  No neck masses.  No JVD.  No lymphadenopathy.

HEART:  With regular rhythm, rate of 88 per minute.

LUNGS:  With decreased breath sounds and few crackles in the right base.

ABDOMEN:  Soft, nontender, nondistended.

EXTREMITIES:  With traces of edema of both feet.  No calf tenderness. 

There is small varicose is noted.



DIAGNOSTIC TESTS:  Her pertinent findings significant for WBC 6, hemoglobin

8.4 in emergency room with hematocrit 28.4 and platelet count 332. 

Repeated hemoglobin this morning 8.5.  Chemistry with normal electrolyte,

but BUN 30, creatinine 1.3.  Her BNP high 10,000.  Her troponin level was

0.04.  Digoxin level was low 0.4.  Her INR was significant for elevated INR

3.5. 

 EKG showed electronic ventricular pacemaker with heart rate of 72,

wide QRS is consistent with ventricular pacemaker. 

Chest x-ray show

increase vascular congestion, no localize infiltrations.



ASSESSMENT:

1.  A 77-year-old female with history of dilated cardiomyopathy, admitted

for increase of dyspnea consistent with decompensated chronic congestive

heart failure.

2.  Anemia iron deficiency with no active bleeding.

3.  Mild renal insufficiency probably prerenal.



PLAN OF TREATMENT:  The patient was admitted to telemetry on observation. 

Cardiology was called and consult.  She was treated with IV Lasix in

Emergency Room.  The patient had repeated hemoglobin and INR, which were

stable with no signs of active bleeding; however, because of history of CHF

and significant shortness of breath.  I feel that the patient would benefit

from blood transfusion to improve her hemoglobin level. I will order Type and 
Cross with 1 unit of blood.

The patient would maintained on Lasix, milrinone, metoprolol,

amiodarone, and spironolactone.





__________________________________________

Shantel Franklin MD





DD:  12/31/2017 10:41:31

DT:  12/31/2017 11:49:52

Job # [id number]

MTDD

## 2018-01-05 NOTE — DS
HOSPITAL COURSE:  The patient is a 77-year-old female with history of

dilated cardiomyopathy, admitted for decompensated congestive heart failure

with progressive worsening dyspnea.  She was treated with IV Lasix in the

emergency room.  She has history of chronic stable iron-deficiency anemia,

but found to have hemoglobin dropped at 8.4 in the emergency room.  The

patient was transfused during the hospitalization with 1 unit of blood.



PHYSICAL EXAMINATION:

VITAL SIGNS:  Vitals were stable during evaluation.  She was afebrile,

temperature 98.4, her blood pressure was 98/62, respiratory rate 18, and

pulse 90 and regular.

GENERAL:  She was comfortable, pale, but in no acute form of distress.

HEENT:  Head, normocephalic and atraumatic.  Eyes with pupils reactive to

light.  Oral mucosa was moist.

NECK:  Supple.

LUNGS:  With decreased breath sounds and crackles in the right lower lung.

HEART:  Regular rhythm and rate.

ABDOMEN:  Soft, nontender, and nondistended.

EXTREMITIES:  No edema.



She had one unit of packed red blood cells transfused with no

complications.



ASSESSMENT:

1.  Decompensated congestive heart failure stage 4.

2.  Dilated cardiomyopathy.

3.  Iron-deficiency anemia.

4.  History of prerenal insufficiency, improved during hospitalization.



PLAN OF TREATMENT:  The patient will be discharged home in stable

condition.  Continue on low-sodium heart-healthy diet.  The patient will be

maintained on her chronic medication, milrinone IV infusion, Entresto,

spironolactone, Coumadin, and Lasix.  The patient was advised to hold

Coumadin for one extra day and then restart on lower dose 5 mg daily.  She

will have her PT and INR checked next week.  The patient was advised to

follow up with her primary care doctor in next week.  We will repeat CBC in

the office and we will review her GI workup with colonoscopy and endoscopy,

which she had in 2017 in [facility].





__________________________________________

Shantel Franklin MD



DD:  01/04/2018 13:37:31

DT:  01/05/2018 4:56:40

Job # [id number]

## 2018-06-07 ENCOUNTER — HOSPITAL ENCOUNTER (OUTPATIENT)
Dept: HOSPITAL 42 - ED | Age: 78
Setting detail: OBSERVATION
LOS: 1 days | Discharge: HOME | End: 2018-06-08
Payer: MEDICARE

## 2018-06-07 VITALS — BODY MASS INDEX: 25 KG/M2 | HEART RATE: 71 BPM

## 2018-06-07 DIAGNOSIS — R79.1: ICD-10-CM

## 2018-06-07 DIAGNOSIS — I25.10: ICD-10-CM

## 2018-06-07 DIAGNOSIS — Z79.01: ICD-10-CM

## 2018-06-07 DIAGNOSIS — I48.0: ICD-10-CM

## 2018-06-07 DIAGNOSIS — N17.9: ICD-10-CM

## 2018-06-07 DIAGNOSIS — I27.20: ICD-10-CM

## 2018-06-07 DIAGNOSIS — I11.0: Primary | ICD-10-CM

## 2018-06-07 DIAGNOSIS — I42.0: ICD-10-CM

## 2018-06-07 DIAGNOSIS — I50.23: ICD-10-CM

## 2018-06-07 DIAGNOSIS — J30.9: ICD-10-CM

## 2018-06-07 DIAGNOSIS — Z90.710: ICD-10-CM

## 2018-06-07 DIAGNOSIS — D64.9: ICD-10-CM

## 2018-06-07 LAB
% IRON SATURATION: 2 % (ref 20–55)
ALBUMIN SERPL-MCNC: 3.9 G/DL (ref 3–4.8)
ALBUMIN/GLOB SERPL: 1.4 {RATIO} (ref 1.1–1.8)
ALT SERPL-CCNC: 34 U/L (ref 7–56)
APTT BLD: 38.1 SECONDS (ref 25.1–36.5)
AST SERPL-CCNC: 30 U/L (ref 14–36)
BASOPHILS # BLD AUTO: 0.06 K/MM3 (ref 0–2)
BASOPHILS NFR BLD: 1 % (ref 0–3)
BUN SERPL-MCNC: 33 MG/DL (ref 7–21)
CALCIUM SERPL-MCNC: 8.9 MG/DL (ref 8.4–10.5)
EOSINOPHIL # BLD: 0.2 10*3/UL (ref 0–0.7)
EOSINOPHIL NFR BLD: 3.1 % (ref 1.5–5)
ERYTHROCYTE [DISTWIDTH] IN BLOOD BY AUTOMATED COUNT: 17.7 % (ref 11.5–14.5)
FERRITIN SERPL-MCNC: 12.2 NG/ML
FOLATE SERPL-MCNC: 17.3 NG/ML
GFR NON-AFRICAN AMERICAN: 40
GRANULOCYTES # BLD: 4.67 10*3/UL (ref 1.4–6.5)
GRANULOCYTES NFR BLD: 76.2 % (ref 50–68)
HDLC SERPL-MCNC: 39 MG/DL (ref 29–60)
HGB BLD-MCNC: 8.7 G/DL (ref 12–16)
INR PPP: 3.33 (ref 0.93–1.08)
IRON SERPL-MCNC: 10 UG/DL (ref 45–180)
LDLC SERPL-MCNC: 72 MG/DL (ref 0–129)
LYMPHOCYTES # BLD: 0.7 10*3/UL (ref 1.2–3.4)
LYMPHOCYTES NFR BLD AUTO: 10.9 % (ref 22–35)
MCH RBC QN AUTO: 21.9 PG (ref 25–35)
MCHC RBC AUTO-ENTMCNC: 30.4 G/DL (ref 31–37)
MCV RBC AUTO: 71.9 FL (ref 80–105)
MONOCYTES # BLD AUTO: 0.5 10*3/UL (ref 0.1–0.6)
MONOCYTES NFR BLD: 8.8 % (ref 1–6)
PLATELET # BLD: 316 10^3/UL (ref 120–450)
PMV BLD AUTO: 8.6 FL (ref 7–11)
PROTHROMBIN TIME: 38.9 SECONDS (ref 9.4–12.5)
RBC # BLD AUTO: 3.98 10^6/UL (ref 3.5–6.1)
TIBC SERPL-MCNC: 466 UG/DL (ref 265–497)
TROPONIN I SERPL-MCNC: 0.03 NG/ML
VIT B12 SERPL-MCNC: 639 PG/ML (ref 239–931)
WBC # BLD AUTO: 6.1 10^3/UL (ref 4.5–11)

## 2018-06-07 PROCEDURE — 83880 ASSAY OF NATRIURETIC PEPTIDE: CPT

## 2018-06-07 PROCEDURE — 82746 ASSAY OF FOLIC ACID SERUM: CPT

## 2018-06-07 PROCEDURE — 96376 TX/PRO/DX INJ SAME DRUG ADON: CPT

## 2018-06-07 PROCEDURE — 93005 ELECTROCARDIOGRAM TRACING: CPT

## 2018-06-07 PROCEDURE — 36415 COLL VENOUS BLD VENIPUNCTURE: CPT

## 2018-06-07 PROCEDURE — 96375 TX/PRO/DX INJ NEW DRUG ADDON: CPT

## 2018-06-07 PROCEDURE — 83550 IRON BINDING TEST: CPT

## 2018-06-07 PROCEDURE — 82728 ASSAY OF FERRITIN: CPT

## 2018-06-07 PROCEDURE — 80053 COMPREHEN METABOLIC PANEL: CPT

## 2018-06-07 PROCEDURE — 99285 EMERGENCY DEPT VISIT HI MDM: CPT

## 2018-06-07 PROCEDURE — 82550 ASSAY OF CK (CPK): CPT

## 2018-06-07 PROCEDURE — 85610 PROTHROMBIN TIME: CPT

## 2018-06-07 PROCEDURE — 85730 THROMBOPLASTIN TIME PARTIAL: CPT

## 2018-06-07 PROCEDURE — 71045 X-RAY EXAM CHEST 1 VIEW: CPT

## 2018-06-07 PROCEDURE — 96374 THER/PROPH/DIAG INJ IV PUSH: CPT

## 2018-06-07 PROCEDURE — 85025 COMPLETE CBC W/AUTO DIFF WBC: CPT

## 2018-06-07 PROCEDURE — 71046 X-RAY EXAM CHEST 2 VIEWS: CPT

## 2018-06-07 PROCEDURE — 80061 LIPID PANEL: CPT

## 2018-06-07 PROCEDURE — 83540 ASSAY OF IRON: CPT

## 2018-06-07 PROCEDURE — 84484 ASSAY OF TROPONIN QUANT: CPT

## 2018-06-07 PROCEDURE — 82607 VITAMIN B-12: CPT

## 2018-06-07 RX ADMIN — MILRINONE LACTATE PRN MLS/HR: 200 INJECTION, SOLUTION INTRAVENOUS at 17:46

## 2018-06-07 NOTE — ED PDOC
Arrival/HPI





- General


Chief Complaint: Shortness Of Breath


Time Seen by Provider: 06/07/18 12:54


Historian: Patient





- History of Present Illness


Narrative History of Present Illness (Text): 


06/07/18 13:11


78 year old female, whose past medical history includes CHF on a milirone drip, 

and a pacemaker to the left chest wall, who presents to the emergency 

department complaining of worsening shortness of breath x 1 week. Patient notes 

increased weight and is worried she's retaining fluid. Patient had a URI 1 week 

ago and was given Amoxicillin with, relief of symptoms. Patient denies any fever

, chills, chest pain, nausea, vomiting, diarrhea, urinary symptoms, back pain, 

neck pain, headache, dizziness, or any other complaints. 





PMD: Dr. Pemberton





Cardiologist: Dr. Infante





Time/Duration: 1 week


Symptom Onset: Gradual


Symptom Course: Worsening


Activities at Onset: Light


Context: Home





Past Medical History





- Provider Review


Nursing Documentation Reviewed: Yes





- Infectious Disease


Hx of Infectious Diseases: None





- Tetanus Immunization


Tetanus Immunization: Unknown





- Cardiac


Hx Cardiac Disorders: Yes


Hx Angina: Yes


Hx Cardiac Arrhythmia: Yes (afib)


Hx Congestive Heart Failure: Yes


Hx Hypertension: Yes


Hx Internal Defibrillator: Yes (2 yrs ago)


Hx Pacemaker: Yes





- Pulmonary


Hx Respiratory Disorders: Yes


Hx Pneumonia: Yes





- Neurological


Hx Neurological Disorder: No





- HEENT


Hx HEENT Disorder: No





- Renal


Hx Renal Disorder: No





- Endocrine/Metabolic


Hx Endocrine Disorders: No





- Hematological/Oncological


Hx Blood Disorders: Yes


Hx Anemia: Yes





- Integumentary


Hx Dermatological Disorder: No





- Musculoskeletal/Rheumatological


Hx Musculoskeletal Disorders: No


Hx Falls: No





- Gastrointestinal


Hx Gastrointestinal Disorders: No





- Genitourinary/Gynecological


Hx Genitourinary Disorders: No





- Psychiatric


Hx Psychophysiologic Disorder: No


Hx Substance Use: No





- Surgical History


Hx Cardiac Catheterization: Yes


Hx Cholecystectomy: Yes





- Anesthesia


Hx Anesthesia: Yes


Hx Anesthesia Reactions: No


Hx Malignant Hyperthermia: No





- Suicidal Assessment


Feels Threatened In Home Enviroment: No





Family/Social History





- Physician Review


Nursing Documentation Reviewed: Yes


Family/Social History: Unknown Family HX


Smoking Status: Never Smoked


Hx Alcohol Use: No


Hx Substance Use: No


Hx Substance Use Treatment: No





Allergies/Home Meds


Allergies/Adverse Reactions: 


Allergies





heparin Allergy (Verified 06/07/18 12:25)


 SWELLING


iodine Allergy (Verified 06/07/18 12:25)


 RASH


levofloxacin Allergy (Verified 06/07/18 12:25)


 RASH


sulfur [From Sulfur-8] Allergy (Verified 06/07/18 12:25)


 RASH








Home Medications: 


 Home Meds











 Medication  Instructions  Recorded  Confirmed


 


Furosemide 40 mg PO QAM 10/22/12 12/30/17


 


Metoprolol Succinate [Toprol XL] 25 mg PO ONCE 12/30/17 12/30/17


 


Pantoprazole [Protonix EC Tab] 40 mg PO ONCE 12/30/17 12/30/17














Review of Systems





- Physician Review


All systems were reviewed & negative as marked: Yes





- Review of Systems


Constitutional: Normal


Eyes: Normal


ENT: Normal


Respiratory: SOB, Cough (non-productive)


Cardiovascular: Normal, Edema.  absent: Chest Pain


Gastrointestinal: Normal.  absent: Abdominal Pain, Diarrhea, Nausea, Vomiting


Genitourinary Female: Normal.  absent: Dysuria, Frequency, Hematuria


Musculoskeletal: Normal


Skin: Normal.  absent: Rash


Neurological: Normal.  absent: Headache, Dizziness


Endocrine: Normal


Hemo/Lymphatic: Normal


Psychiatric: Normal





Physical Exam


Vital Signs











  Temp Pulse Resp BP Pulse Ox


 


 06/07/18 14:36  97.7 F  73  18  96/66 L  95


 


 06/07/18 13:28    18  











Temperature: Afebrile


Blood Pressure: Normal


Pulse: Regular


Respiratory Rate: Normal


Appearance: Positive for: Well-Appearing, Non-Toxic, Comfortable


Pain Distress: None


Mental Status: Positive for: Alert and Oriented X 3





- Systems Exam


Head: Present: Atraumatic, Normocephalic


Pupils: Present: PERRL


Extroacular Muscles: Present: EOMI


Conjunctiva: Present: Normal


Mouth: Present: Moist Mucous Membranes


Neck: Present: Normal Range of Motion


Respiratory/Chest: Present: Decreased Breath Sounds.  No: Respiratory Distress, 

Accessory Muscle Use


Cardiovascular: Present: Regular Rate and Rhythm, Normal S1, S2, Other (

pacemaker left chest wall).  No: Murmurs


Abdomen: No: Tenderness, Distention, Peritoneal Signs


Back: Present: Normal Inspection


Upper Extremity: Present: Normal Inspection, Norm 2-Pt Discrimination.  No: 

Cyanosis, Edema


Lower Extremity: Present: Edema


Neurological: Present: GCS=15, CN II-XII Intact, Speech Normal


Skin: Present: Warm, Dry, Normal Color.  No: Rashes


Psychiatric: Present: Alert, Oriented x 3, Normal Insight, Normal Concentration





Medical Decision Making


ED Course and Treatment: 


06/07/18 13:17


Impression:


78 year old female presents to the emergency department complaining of 

worsening shortness of breath x 1 week.





Plan:


-- Labs


-- Troponin


-- Chest X-ray


-- Reassess and disposition





Progress Notes:


06/07/18 14:44


Chest X-ray reviewed, shows: 


LUNGS:


No active pulmonary disease.





PLEURA:


No significant pleural effusion identified. No pneumothorax apparent.





CARDIOVASCULAR:


There is severe cardiomegaly. There is a dual lead pacemaker.





OSSEOUS STRUCTURES:


No significant abnormalities.





VISUALIZED UPPER ABDOMEN:


Normal.





OTHER FINDINGS:


Right-sided PICC line in the SVC





IMPRESSION:


No active disease.





06/07/18 15:21


Trop x 1 negative.  BNP elevated.  Lasix ordered.  Spoke to patient's PMD who 

is requesting observation by hospitalist.  Milirone ordered based on home dose (

248mg/310ml D5W, 1.8ml/hr, 0.375mcg/kg/min).  Will need tele observation for 

diuresis.








- Lab Interpretations


Lab Results: 








 06/07/18 13:28 





 06/07/18 13:28 





 Lab Results





06/07/18 13:28: Sodium 139, Potassium 4.3, Chloride 103, Carbon Dioxide 24, 

Anion Gap 16, BUN 33 H, Creatinine 1.3 H, Est GFR ( Amer) 48, Est GFR (

Non-Af Amer) 40, Random Glucose 101, Calcium 8.9, Total Bilirubin 0.5, AST 30, 

ALT 34, Alkaline Phosphatase 110, Total Creatine Kinase 85, Troponin I 0.03  D, 

NT-Pro-B Natriuret Pep 03143 H, Total Protein 6.7, Albumin 3.9, Globulin 2.7, 

Albumin/Globulin Ratio 1.4


06/07/18 13:28: PT 38.9 H, INR 3.33 H, APTT 38.1 H


06/07/18 13:28: WBC 6.1, RBC 3.98, Hgb 8.7 L, Hct 28.6 L, MCV 71.9 L, MCH 21.9 L

, MCHC 30.4 L, RDW 17.7 H, Plt Count 316, MPV 8.6, Gran % 76.2 H, Lymph % (Auto

) 10.9 L, Mono % (Auto) 8.8 H, Eos % (Auto) 3.1, Baso % (Auto) 1.0, Gran # 4.67

, Lymph # (Auto) 0.7 L, Mono # (Auto) 0.5, Eos # (Auto) 0.2, Baso # (Auto) 0.06











- RAD Interpretation


Radiology Orders: 








06/07/18 12:54


CHEST TWO VIEWS (PA/LAT) [RAD] Stat 














- Medication Orders


Current Medication Orders: 








Milrinone Lactate/Dextrose (Primacor 20mg/100ml D5w)  100 mls @ 8.165 mls/hr IV 

.[id number] PRN; 0.375 MCG/KG/MIN


   PRN Reason: TITRATE PER MD ORDER





Discontinued Medications





Furosemide (Lasix)  40 mg IVP STAT STA


   Stop: 06/07/18 14:58











- Scribe Statement


The provider has reviewed the documentation as recorded by the Scribe


Karen Atkinson





All medical record entries made by the Scribe were at my direction and 

personally dictated by me. I have reviewed the chart and agree that the record 

accurately reflects my personal performance of the history, physical exam, 

medical decision making, and the department course for this patient. I have 

also personally directed, reviewed, and agree with the discharge instructions 

and disposition.











Disposition/Present on Arrival





- Present on Arrival


Any Indicators Present on Arrival: No


History of DVT/PE: No


History of Uncontrolled Diabetes: No


Urinary Catheter: No


History of Decub. Ulcer: No


History Surgical Site Infection Following: None





- Disposition


Have Diagnosis and Disposition been Completed?: No


Diagnosis: 


 Anemia, Congestive heart failure, Shortness of breath





Disposition: HOSPITALIZED


Disposition Time: 15:05


Patient Plan: Admission


Patient Problems: 


 Current Active Problems











Problem Status Onset


 


Congestive heart failure Acute  


 


Anemia Acute  











Condition: FAIR


Discharge Instructions (ExitCare):  Heart Failure (ED)


Referrals: 


Shantel Franklin MD [Primary Care Provider] - Follow up with primary


Forms:  [employer] (English)

## 2018-06-07 NOTE — RAD
HISTORY:

cough  



COMPARISON:

12/30/2017



TECHNIQUE:

Chest PA and lateral



FINDINGS:



LUNGS:

No active pulmonary disease.



PLEURA:

No significant pleural effusion identified. No pneumothorax apparent.



CARDIOVASCULAR:

There is severe cardiomegaly. There is a dual lead pacemaker.



OSSEOUS STRUCTURES:

No significant abnormalities.



VISUALIZED UPPER ABDOMEN:

Normal.



OTHER FINDINGS:

Right-sided PICC line in the SVC



IMPRESSION:

No active disease.

## 2018-06-07 NOTE — CP.PCM.HP
<Katt Mcgraw - Last Filed: 18 18:00>





History of Present Illness





- History of Present Illness


History of Present Illness: 





Katt Mcgraw, PGY1, Progress Note for Dr Ward:





CC: worsening sob, body swelling, weight gain?





chf exacerbation, anemia, supratherapeutic INR, mild NEGRA





78 year old female with PMH dilated cardiomyopathy, allergic rhinitis, 

nonobstructive CAD with no stents, HLD, migraine, constipation, YOGESH (not on 

home bipap), systolic CHF with EF 19% (2017), presents for worsening sob for 

past week. Pt states that she has had URI like symptoms for past week, finished 

amoxicillin last Friday as per PMD, with improvement of her cough. However, she 

still has a dry cough. She reports exertional dyspnea with walking a block or 

so (previously was able to walk a 4-5 blocks). Denies using extra pillows to 

sleep at night. States that she feels that "all her body is swollen" with leg 

swelling as well. Denies recent travel or sick contacts. Denies cp, headache, n/

v/f/c, abdominal pain, diarrhea, poor PO intake, dizziness, weakness, decreased 

appetite, urinary symptoms. 





In ED: pt afebrile, hemodynamically stable. Trace pedal edema on exam with 

mildly decreased b/l breath sounds on lung exam. No leukocytosis, 

supratherapeutic INR 3.33. CXR shows mild vascular congestion. Given lasix 40 IV

, started on home milrinone drip @ 0.375 mcg/kg/min. 





12 point ROS obtained and negative, except as per HPI.





PMD: Dr. Pemberton


Cardiologist: Dr. Infante





PMH: dilated cardiomyopathy, allergic rhinitis, nonobstructive CAD with no 

stents, HLD, migraine, constipation, YOGESH (not on home bipap), systolic CHF with 

EF 19% (2017)


PSH: hysterectomy, pacemaker defibrillator inserted in 


All: iodine (anaphylactic shock), levofloxacin (rash), sulfa (rash)


Meds: lasix 40 mg PO AM, 20 mg PM daily; protonix, amiodarone, coumadin 5 mg (

Mon-Thurs), 2.5 mg (Fri-Sun); ASA 81 mg daily, Entresto (unknown dosage - will 

check in AM)


FH: Father,  of CHF


Mother, passed away from "old age"


SH: lives with daughter. Walks by self, no assistive devices. Denied alcohol, 

ciggs, recreational drug use.





Prior [facility] visits: syncope on 2017








Present on Admission





- Present on Admission


Any Indicators Present on Admission: No


History of DVT/PE: No


History of Uncontrolled Diabetes: No


Urinary Catheter: No


Decubitus Ulcer Present: No





Review of Systems





- Review of Systems


All systems: reviewed and no additional remarkable complaints except


Review of Systems: 





as per HPI





Past Patient History





- Infectious Disease


Hx of Infectious Diseases: None





- Tetanus Immunizations


Tetanus Immunization: Unknown





- Past Social History


Smoking Status: Never Smoked





- CARDIAC


Hx Cardiac Disorders: Yes


Hx Angina: Yes


Hx Cardia Arrhythmia: Yes (afib)


Hx Congestive Heart Failure: Yes


Hx Hypertension: Yes


Hx Internal Defibrillator: Yes (2 yrs ago)


Hx Pacemaker: Yes





- PULMONARY


Hx Respiratory Disorders: Yes


Hx Pneumonia: Yes





- NEUROLOGICAL


Hx Neurological Disorder: No





- HEENT


Hx HEENT Problems: No





- RENAL


Hx Chronic Kidney Disease: No





- ENDOCRINE/METABOLIC


Hx Endocrine Disorders: No





- HEMATOLOGICAL/ONCOLOGICAL


Hx Blood Disorders: Yes


Hx Anemia: Yes





- INTEGUMENTARY


Hx Dermatological Problems: No





- MUSCULOSKELETAL/RHEUMATOLOGICAL


Hx Musculoskeletal Disorders: No


Hx Falls: No





- GASTROINTESTINAL


Hx Gastrointestinal Disorders: No





- GENITOURINARY/GYNECOLOGICAL


Hx Genitourinary Disorders: No





- PSYCHIATRIC


Hx Psychophysiologic Disorder: No


Hx Substance Use: No





- SURGICAL HISTORY


Hx Cardiac Catheterization: Yes


Hx Cholecystectomy: Yes





- ANESTHESIA


Hx Anesthesia: Yes


Hx Anesthesia Reactions: No


Hx Malignant Hyperthermia: No





Meds


Allergies/Adverse Reactions: 


 Allergies











Allergy/AdvReac Type Severity Reaction Status Date / Time


 


heparin Allergy  SWELLING Verified 18 12:25


 


iodine Allergy  RASH Verified 18 12:25


 


levofloxacin Allergy  RASH Verified 18 12:25


 


sulfur [From Sulfur-8] Allergy  RASH Verified 18 12:25














Physical Exam





- Constitutional


Appears: Non-toxic, No Acute Distress, Older Than Stated Age





- Head Exam


Head Exam: ATRAUMATIC, NORMOCEPHALIC





- Eye Exam


Eye Exam: EOMI, Normal appearance, PERRL.  absent: Conjunctival injection, 

Nystagmus, Scleral icterus


Pupil Exam: NORMAL ACCOMODATION, PERRL.  absent: Fixed, Irregular, Miosis, 

Unequal





- ENT Exam


ENT Exam: Mucous Membranes Moist





- Neck Exam


Neck exam: Positive for: Full Rom





- Respiratory Exam


Respiratory Exam: Decreased Breath Sounds (in bilateral lower lobes, otherwise 

CTA b/l in upper lobes).  absent: Accessory Muscle Use, Rales, Rhonchi, Wheezes

, Respiratory Distress, Stridor





- Cardiovascular Exam


Cardiovascular Exam: RRR, +S1, +S2.  absent: Systolic Murmur





- GI/Abdominal Exam


GI & Abdominal Exam: Normal Bowel Sounds, Soft.  absent: Distended, Firm, 

Guarding, Mass, Organomegaly, Rebound, Rigid, Tenderness





- Extremities Exam


Extremities exam: Positive for: normal capillary refill, pedal edema (trace b/l 

pedal edema), pedal pulses present.  Negative for: calf tenderness





- Back Exam


Back exam: NORMAL INSPECTION.  absent: CVA tenderness (L), CVA tenderness (R)





- Neurological Exam


Neurological exam: Alert, Oriented x3





- Psychiatric Exam


Psychiatric exam: Normal Affect, Normal Mood





- Skin


Skin Exam: Dry, Normal Color, Warm





Results





- Vital Signs


Recent Vital Signs: 





 Last Vital Signs











Temp  97.7 F   18 16:43


 


Pulse  85   18 17:46


 


Resp  18   18 16:43


 


BP  131/59 L  18 17:46


 


Pulse Ox  95   18 16:43














- Labs


Result Diagrams: 


 18 13:28





 18 13:28





Assessment & Plan





- Assessment and Plan (Free Text)


Assessment: 











78 year old female with PMH systolic CHF with EF 19% (2017) on home 

milrinone drip, cardiomyopathy, nonobstructive CAD, HLD, constipation, YOGESH, afib

? on Coumadin, presents for worsening SOB, URI symptoms:





Worsening SOB:


2/2 acute bronchitis vs CHF exacerbation


- received lasix in ED


- BNP elevated


- Lasix 60 mg IV daily (pt at 60 mg PO daily at home)


- Strict I&Os


- Daily weights


- Fluid restriction to 1200 cc


- CXR shows severe cardiomegaly, mild vascular congestion, no infiltrates


- repeat CXR tomorrow


- Zithromax





Chronic anemia:


- microcytic MCV 71


- iron studies, b12, folate


- monitor





Supratherapeutic INR:


- INR 3.33


- Hold coumadin dose today


- home regimen: Coumadin 5 mg Mon-Thurs. 2.5 mg Fri-Sun


- Monitor daily INR





Hx of CHF/CAD:


- echo 2017 shows EF 19.7%. four chamber enlargement. severe MR. mod to 

severe TR. severe global hypokinesis.


- c/w home milrinone drip


- home toprol with holding parameters


- home amiodarone





PPX: protonix, scds


HHD (2 gm Na, 1.2 L fluid)





Case discussed with Dr Ward.


Katt Mcgraw, PGY1








- Date & Time


Date: 18


Time: 18:29





<Spike Ward - Last Filed: 18 18:43>





Results





- Vital Signs


Recent Vital Signs: 





 Last Vital Signs











Temp  97.7 F   18 16:43


 


Pulse  85   18 17:46


 


Resp  18   18 16:43


 


BP  131/59 L  18 17:46


 


Pulse Ox  95   18 16:43














- Labs


Result Diagrams: 


 18 13:28





 18 13:28





Attending/Attestation





- Attestation


Notes (Text): 





Patient seen and examined. Agree with above


Symptoms of worsening dyspnea over the past few days with lower ext edema


Most likely attributed to acute exacerbation of systolic HF with reduced EF and 

NYHA class III-IV symptoms


IV lasix for diuresis. Continue with Milrinone gtt


Will assess cardiopulmonary status; daily weights, I/O's, fluid restriction


Further management, diagnostics and/or treatment as hospital course progresses

## 2018-06-07 NOTE — CARD
--------------- APPROVED REPORT --------------





EKG Measurement

Heart [id number]

WI [id number]-[id number]

[id number]

[id number]

[id number]



<Conclusion>

AV sequential or dual chamber electronic pacemaker: 100% AV paced

## 2018-06-08 VITALS — TEMPERATURE: 98.6 F | SYSTOLIC BLOOD PRESSURE: 97 MMHG | DIASTOLIC BLOOD PRESSURE: 58 MMHG

## 2018-06-08 VITALS — RESPIRATION RATE: 18 BRPM

## 2018-06-08 VITALS — OXYGEN SATURATION: 93 %

## 2018-06-08 VITALS — HEART RATE: 75 BPM

## 2018-06-08 LAB
ALBUMIN SERPL-MCNC: 3.5 G/DL (ref 3–4.8)
ALBUMIN/GLOB SERPL: 1.3 {RATIO} (ref 1.1–1.8)
ALT SERPL-CCNC: 36 U/L (ref 7–56)
APTT BLD: 32.7 SECONDS (ref 25.1–36.5)
AST SERPL-CCNC: 30 U/L (ref 14–36)
BASOPHILS # BLD AUTO: 0.05 K/MM3 (ref 0–2)
BASOPHILS NFR BLD: 0.8 % (ref 0–3)
BUN SERPL-MCNC: 32 MG/DL (ref 7–21)
CALCIUM SERPL-MCNC: 8.7 MG/DL (ref 8.4–10.5)
EOSINOPHIL # BLD: 0.3 10*3/UL (ref 0–0.7)
EOSINOPHIL NFR BLD: 4.8 % (ref 1.5–5)
ERYTHROCYTE [DISTWIDTH] IN BLOOD BY AUTOMATED COUNT: 17.8 % (ref 11.5–14.5)
GFR NON-AFRICAN AMERICAN: 37
GRANULOCYTES # BLD: 4.39 10*3/UL (ref 1.4–6.5)
GRANULOCYTES NFR BLD: 72.4 % (ref 50–68)
HGB BLD-MCNC: 8.3 G/DL (ref 12–16)
INR PPP: 2.91 (ref 0.93–1.08)
LYMPHOCYTES # BLD: 0.9 10*3/UL (ref 1.2–3.4)
LYMPHOCYTES NFR BLD AUTO: 14.4 % (ref 22–35)
MCH RBC QN AUTO: 21.7 PG (ref 25–35)
MCHC RBC AUTO-ENTMCNC: 30.3 G/DL (ref 31–37)
MCV RBC AUTO: 71.5 FL (ref 80–105)
MONOCYTES # BLD AUTO: 0.5 10*3/UL (ref 0.1–0.6)
MONOCYTES NFR BLD: 7.6 % (ref 1–6)
PLATELET # BLD: 309 10^3/UL (ref 120–450)
PMV BLD AUTO: 8.7 FL (ref 7–11)
PROTHROMBIN TIME: 34.2 SECONDS (ref 9.4–12.5)
RBC # BLD AUTO: 3.83 10^6/UL (ref 3.5–6.1)
WBC # BLD AUTO: 6.1 10^3/UL (ref 4.5–11)

## 2018-06-08 RX ADMIN — MILRINONE LACTATE PRN MLS/HR: 200 INJECTION, SOLUTION INTRAVENOUS at 04:50

## 2018-06-08 NOTE — CP.PCM.DIS
<Chino Calix - Last Filed: 06/08/18 12:53>





Provider





- Provider


Date of Admission: 


06/07/18 15:02





Attending physician: 


Spike Ward





Primary care physician: 


Shantel Pemberton MD





Time Spent in preparation of Discharge (in minutes): 45





Hospital Course





- Lab Results


Lab Results: 


 Most Recent Lab Values











WBC  6.1 10^3/ul (4.5-11.0)   06/08/18  05:40    


 


RBC  3.83 10^6/uL (3.5-6.1)   06/08/18  05:40    


 


Hgb  8.3 g/dL (12.0-16.0)  L  06/08/18  05:40    


 


Hct  27.4 % (36.0-48.0)  L  06/08/18  05:40    


 


MCV  71.5 fl (80.0-105.0)  L  06/08/18  05:40    


 


MCH  21.7 pg (25.0-35.0)  L  06/08/18  05:40    


 


MCHC  30.3 g/dl (31.0-37.0)  L  06/08/18  05:40    


 


RDW  17.8 % (11.5-14.5)  H  06/08/18  05:40    


 


Plt Count  309 10^3/uL (120.0-450.0)   06/08/18  05:40    


 


MPV  8.7 fl (7.0-11.0)   06/08/18  05:40    


 


Gran %  72.4 % (50.0-68.0)  H  06/08/18  05:40    


 


Lymph % (Auto)  14.4 % (22.0-35.0)  L  06/08/18  05:40    


 


Mono % (Auto)  7.6 % (1.0-6.0)  H  06/08/18  05:40    


 


Eos % (Auto)  4.8 % (1.5-5.0)   06/08/18  05:40    


 


Baso % (Auto)  0.8 % (0.0-3.0)   06/08/18  05:40    


 


Gran #  4.39  (1.4-6.5)   06/08/18  05:40    


 


Lymph # (Auto)  0.9  (1.2-3.4)  L  06/08/18  05:40    


 


Mono # (Auto)  0.5  (0.1-0.6)   06/08/18  05:40    


 


Eos # (Auto)  0.3  (0.0-0.7)   06/08/18  05:40    


 


Baso # (Auto)  0.05 K/mm3 (0.0-2.0)   06/08/18  05:40    


 


PT  34.2 SECONDS (9.4-12.5)  H  06/08/18  05:40    


 


INR  2.91  (0.93-1.08)  H  06/08/18  05:40    


 


APTT  32.7 Seconds (25.1-36.5)   06/08/18  05:40    


 


Sodium  144 mmol/L (132-148)   06/08/18  05:40    


 


Potassium  4.0 mmol/L (3.6-5.0)   06/08/18  05:40    


 


Chloride  106 mmol/L ()   06/08/18  05:40    


 


Carbon Dioxide  26 mmol/L (21-33)   06/08/18  05:40    


 


Anion Gap  16  (10-20)   06/08/18  05:40    


 


BUN  32 mg/dL (7-21)  H  06/08/18  05:40    


 


Creatinine  1.4 mg/dl (0.7-1.2)  H  06/08/18  05:40    


 


Est GFR ( Amer)  44   06/08/18  05:40    


 


Est GFR (Non-Af Amer)  37   06/08/18  05:40    


 


Random Glucose  97 mg/dL ()   06/08/18  05:40    


 


Calcium  8.7 mg/dL (8.4-10.5)   06/08/18  05:40    


 


Iron  10 ug/dL ()  L  06/07/18  13:28    


 


TIBC  466 ug/dL (265-497)   06/07/18  13:28    


 


% Saturation  2 % (20-55)  L  06/07/18  13:28    


 


Ferritin  12.2 ng/mL  06/07/18  13:28    


 


Total Bilirubin  0.6 mg/dL (0.2-1.3)   06/08/18  05:40    


 


AST  30 U/L (14-36)   06/08/18  05:40    


 


ALT  36 U/L (7-56)   06/08/18  05:40    


 


Alkaline Phosphatase  113 U/L ()   06/08/18  05:40    


 


Total Creatine Kinase  85 U/L ()   06/07/18  13:28    


 


Troponin I  0.03 ng/mL D 06/07/18  13:28    


 


NT-Pro-B Natriuret Pep  28306 pg/mL (0-450)  H  06/07/18  13:28    


 


Total Protein  6.2 g/dL (5.8-8.3)   06/08/18  05:40    


 


Albumin  3.5 g/dL (3.0-4.8)   06/08/18  05:40    


 


Globulin  2.8 gm/dL  06/08/18  05:40    


 


Albumin/Globulin Ratio  1.3  (1.1-1.8)   06/08/18  05:40    


 


Triglycerides  98 mg/dL ()   06/07/18  13:28    


 


Cholesterol  143 mg/dL (130-200)   06/07/18  13:28    


 


LDL Cholesterol Direct  72 mg/dL (0-129)   06/07/18  13:28    


 


HDL Cholesterol  39 mg/dL (29-60)   06/07/18  13:28    


 


Vitamin B12  639 pg/mL (239-931)   06/07/18  13:28    


 


Folate  17.3 ng/mL  06/07/18  13:28    














- Hospital Course


Hospital Course: 





78 year old female with PMH dilated cardiomyopathy, allergic rhinitis, 

nonobstructive CAD with no stents, HLD, migraine, constipation, YOGESH (not on 

home bipap), systolic CHF with EF 19% (11/2017), presented for worsening sob 

for past week. Pt states that she has had URI like symptoms for past week, 

finished amoxicillin last Friday as per PMD, with improvement of her cough. 

However, she still has a dry cough. She reports exertion dyspnea with walking a 

block or so (previously was able to walk a 4-5 blocks). Stated that she feels 

that "all her body is swollen" with leg swelling as well. Admits to eating very 

salty foods over the weekend. Denies recent travel or sick contacts. While 

patient in the hospital, patient received a total of 120mg of lasix. Patient 

states swelling signficiantly decreased, and breathing became much easier. 

Patient started to walk further without shortness of breath. Cardiology came 

and evaluated the patient, and agreed no further need for intervention and 

continue the Lasix. INR was supratherapeutic at 3.33. INR was held and repeat 

is 2.7. Ok for the patient to resume tomorrow at home. 


Patient cleared for discharge home in stable condition. No need for new or 

additional medications at this time. Patient was counseled on a low sodium 

diet. Patient set to follow up with Dr. Infante and primary doctor in office.  








Discharge Exam





- Head Exam


Head Exam: ATRAUMATIC, NORMOCEPHALIC





- Eye Exam


Eye Exam: EOMI.  absent: Scleral icterus


Pupil Exam: NORMAL ACCOMODATION





- ENT Exam


ENT Exam: Mucous Membranes Moist





- Neck Exam


Neck exam: Full Rom





- Cardiovascular Exam


Cardiovascular Exam: +S1, +S2.  absent: Bradycardia, Tachycardia





- GI/Abdominal Exam


GI & Abdominal Exam: Soft.  absent: Distended, Guarding, Mass, Rigid, Tenderness





- Extremities Exam


Additional comments: 





no pedal edema


much improved from yesterday





- Back Exam


Back exam: absent: CVA tenderness (L), CVA tenderness (R)





- Neurological Exam


Neurological exam: Alert, Oriented x3





- Psychiatric Exam


Psychiatric exam: Normal Affect





- Skin


Skin Exam: Intact, Warm





Discharge Plan





- Follow Up Plan


Condition: FAIR


Disposition: HOME/ ROUTINE


Instructions:  Heart Healthy Diet, Milrinone, Heart Failure (DC), Pacemaker (GEN

), Pulmonary Edema (DC)


Additional Instructions: 


Discharge instructions:  Please take the 5 mg of Coumadin when you get home 

tonight and resume all the home medications.  Please weigh daily; if weight 

gain of 2 lbs or more, shortness of breath and/or swelling/edema noted- call 

doctor.  Follow up with your primary physician within 1-2 weeks and follow up 

with Dr Infante in 1 week; scheduled appointment for June 18 at 2:15 pm.   If 

condition occurs again, go to the nearest emergency room.  Continue "primacor 

home infusion" as previous.





Diet:  Heart Healthy diet and Avoid added salt.  Avoid over abundance of green 

leafy vegetables while on coumadin.





Patient states she is up to date with regards to the flu and the pneumococcal 

vaccines.


Referrals: 


Balwinder Infante MD [Staff Provider] - 06/18/18 2:15 pm


()


Shantel Franklin MD [Primary Care Provider] - 





<Eulalia,Leonardo - Last Filed: 06/08/18 17:28>





Provider





- Provider


Date of Admission: 


06/07/18 15:02





Attending physician: 


Spike Ward





Primary care physician: 


Shantel Pemberton MD








Hospital Course





- Lab Results


Lab Results: 


 Most Recent Lab Values











WBC  6.1 10^3/ul (4.5-11.0)   06/08/18  05:40    


 


RBC  3.83 10^6/uL (3.5-6.1)   06/08/18  05:40    


 


Hgb  8.3 g/dL (12.0-16.0)  L  06/08/18  05:40    


 


Hct  27.4 % (36.0-48.0)  L  06/08/18  05:40    


 


MCV  71.5 fl (80.0-105.0)  L  06/08/18  05:40    


 


MCH  21.7 pg (25.0-35.0)  L  06/08/18  05:40    


 


MCHC  30.3 g/dl (31.0-37.0)  L  06/08/18  05:40    


 


RDW  17.8 % (11.5-14.5)  H  06/08/18  05:40    


 


Plt Count  309 10^3/uL (120.0-450.0)   06/08/18  05:40    


 


MPV  8.7 fl (7.0-11.0)   06/08/18  05:40    


 


Gran %  72.4 % (50.0-68.0)  H  06/08/18  05:40    


 


Lymph % (Auto)  14.4 % (22.0-35.0)  L  06/08/18  05:40    


 


Mono % (Auto)  7.6 % (1.0-6.0)  H  06/08/18  05:40    


 


Eos % (Auto)  4.8 % (1.5-5.0)   06/08/18  05:40    


 


Baso % (Auto)  0.8 % (0.0-3.0)   06/08/18  05:40    


 


Gran #  4.39  (1.4-6.5)   06/08/18  05:40    


 


Lymph # (Auto)  0.9  (1.2-3.4)  L  06/08/18  05:40    


 


Mono # (Auto)  0.5  (0.1-0.6)   06/08/18  05:40    


 


Eos # (Auto)  0.3  (0.0-0.7)   06/08/18  05:40    


 


Baso # (Auto)  0.05 K/mm3 (0.0-2.0)   06/08/18  05:40    


 


PT  34.2 SECONDS (9.4-12.5)  H  06/08/18  05:40    


 


INR  2.91  (0.93-1.08)  H  06/08/18  05:40    


 


APTT  32.7 Seconds (25.1-36.5)   06/08/18  05:40    


 


Sodium  144 mmol/L (132-148)   06/08/18  05:40    


 


Potassium  4.0 mmol/L (3.6-5.0)   06/08/18  05:40    


 


Chloride  106 mmol/L ()   06/08/18  05:40    


 


Carbon Dioxide  26 mmol/L (21-33)   06/08/18  05:40    


 


Anion Gap  16  (10-20)   06/08/18  05:40    


 


BUN  32 mg/dL (7-21)  H  06/08/18  05:40    


 


Creatinine  1.4 mg/dl (0.7-1.2)  H  06/08/18  05:40    


 


Est GFR ( Amer)  44   06/08/18  05:40    


 


Est GFR (Non-Af Amer)  37   06/08/18  05:40    


 


Random Glucose  97 mg/dL ()   06/08/18  05:40    


 


Calcium  8.7 mg/dL (8.4-10.5)   06/08/18  05:40    


 


Iron  10 ug/dL ()  L  06/07/18  13:28    


 


TIBC  466 ug/dL (265-497)   06/07/18  13:28    


 


% Saturation  2 % (20-55)  L  06/07/18  13:28    


 


Ferritin  12.2 ng/mL  06/07/18  13:28    


 


Total Bilirubin  0.6 mg/dL (0.2-1.3)   06/08/18  05:40    


 


AST  30 U/L (14-36)   06/08/18  05:40    


 


ALT  36 U/L (7-56)   06/08/18  05:40    


 


Alkaline Phosphatase  113 U/L ()   06/08/18  05:40    


 


Total Creatine Kinase  85 U/L ()   06/07/18  13:28    


 


Troponin I  0.03 ng/mL D 06/07/18  13:28    


 


NT-Pro-B Natriuret Pep  47328 pg/mL (0-450)  H  06/07/18  13:28    


 


Total Protein  6.2 g/dL (5.8-8.3)   06/08/18  05:40    


 


Albumin  3.5 g/dL (3.0-4.8)   06/08/18  05:40    


 


Globulin  2.8 gm/dL  06/08/18  05:40    


 


Albumin/Globulin Ratio  1.3  (1.1-1.8)   06/08/18  05:40    


 


Triglycerides  98 mg/dL ()   06/07/18  13:28    


 


Cholesterol  143 mg/dL (130-200)   06/07/18  13:28    


 


LDL Cholesterol Direct  72 mg/dL (0-129)   06/07/18  13:28    


 


HDL Cholesterol  39 mg/dL (29-60)   06/07/18  13:28    


 


Vitamin B12  639 pg/mL (239-931)   06/07/18  13:28    


 


Folate  17.3 ng/mL  06/07/18  13:28    














Attending/Attestation





- Attestation


I have personally seen and examined this patient.: Yes


I have fully participated in the care of the patient.: Yes


I have reviewed all pertinent clinical information, including history, physical 

exam and plan: Yes


Notes (Text): 





acute on chronic systolic chf

## 2018-06-08 NOTE — RAD
HISTORY:

chf exacerbation  



COMPARISON:

06/07/2018 



FINDINGS:



LUNGS:

No active pulmonary disease.



PLEURA:

No significant pleural effusion identified, no pneumothorax apparent.



CARDIOVASCULAR:

Moderate cardiomegaly. 



OSSEOUS STRUCTURES:

No significant abnormalities.



VISUALIZED UPPER ABDOMEN:

Normal.



OTHER FINDINGS:

Right-sided PICC line in satisfactory position. Tool lead pacemaker



IMPRESSION:

No active disease.

## 2018-06-08 NOTE — CON
DATE:  06/08/2018



INDICATION:  Shortness of breath, edema, and history of CHF.



HISTORY OF PRESENT ILLNESS:  This is a 78-year-old woman known to me,

admitted with shortness of breath which progressed on Wednesday evening

with weight gain noted and increased peripheral edema.  She came to the

emergency room.  She was given IV Lasix.  This morning, she feels much

better and back to normal.  She reports eating at a restaurant last

weekend, where she may have had more salt than she is used to.  She also

has had a recent URI.  There is no chest pain, orthopnea, PND, syncope,

presyncope, lightheadedness, dizziness, vertigo, palpitation, fever,

chills, hemoptysis, sputum production, abdominal pain, nausea, vomiting,

diarrhea, constipation, or melena.



PAST MEDICAL HISTORY:  Complex.  She has severe LV dysfunction with a

cardiomyopathy, on home milrinone and followed by the [facility] Heart Failure Team.  She has had congestive heart failure

and has been considered for cardiac transplantation in the past. 

Additional past medical history includes congestive heart failure,

hypertension, defibrillator, paroxysmal atrial fibrillation, anemia,

hysterectomy, and pneumonia.  She has pulmonary hypertension.



MEDICATIONS:  At the time of admission include Aldactone, amiodarone,

warfarin, Lasix b.i.d., Protonix, metoprolol, Entresto, aspirin, and

milrinone infusion.



ALLERGIES:  SHE NOTED AN ALLERGY TO IODINE, SULFA, AND LEVAQUIN.



SOCIAL HISTORY:  She lives at home with her daughter.  She is ambulatory,

but limited.  She does not smoke.  She does not drink alcohol.



FAMILY HISTORY:  Notable for heart disease.



REVIEW OF SYSTEMS:  Ten-point review of systems is otherwise unremarkable

except as noted above.



PHYSICAL EXAMINATION:

GENERAL:  She is a well-developed woman, lying flat in bed on 2R telemetry,

in no acute distress.

VITAL SIGNS:  She has a paced rhythm at 76 beats per minute.  She is

afebrile.  Blood pressure 95/60, respirations 18, O2 sat 95% to 99% on room

air.

HEENT:  Reveal neck vein distention.  No thyromegaly, no carotid bruits. 

Mucous membranes moist.  Conjunctivae pale.  Neck is supple.

LUNGS:  Lung fields, a few scattered rales.

HEART:  Normal first and second heart sounds.  PMI is displaced laterally.

ABDOMEN:  Soft.  Bowel sounds are present.  There is no mass, organomegaly,

tenderness, rebound, or guarding.  No CVA tenderness.  No palpable

abdominal aortic aneurysm.

EXTREMITIES:  Reveal no cyanosis with a trace of pedal edema involving the

dorsa of the feet.

NEUROLOGIC:  Awake, alert, and oriented.

PSYCHIATRIC:  Normal as to mood and affect.

SKIN:  Warm and dry.  No rash or cellulitis.



LABORATORY AND IMAGING:  A portable chest x-ray which read as no acute

disease.  EKG shows AV pacing throughout.  White count normal.  Hemoglobin

8.3, hematocrit 27.4, and platelet count 309,000.  INR 2.91 this morning,

initially 3.3 yesterday.  Electrolytes, BUN and creatinine unremarkable. 

Creatinine is 1.4 this morning.  LFTs are unremarkable.  Troponin 0.03. 

BNP 12,900.  Total cholesterol 143, LDL 72.  B12 and folate levels are

okay.



IMPRESSION:  Crystal Roger is a 78-year-old woman, well known to me with

end-stage congestive cardiomyopathy, on home milrinone, Entresto, and

diuretics; who has developed increased shortness of breath and edema with

weight gain which has developed over the last week or so.  She had a course

of an antibiotic for an upper respiratory infection.  Her symptoms were

worsened and she came to the hospital with improvement, following IV Lasix.



At this point, I agreed with current plans.  We are resuming her usual

medications with the exception that Entresto does not seem to be on the

formulary.  She will get a dose of IV Lasix this morning.  She would like

to go home later today to resume her usual medications and usual followup

with the Heart Failure Team at [facility].  She also

had a recent ICD check by Dr. Estrada and everything is in order.  She will

also follow with me.  She will restrict her sodium intake.  She will

restrict her activity levels until she is feeling much better.  She will

continue milrinone infusion and she will call if there are any recurring

symptoms.







__________________________________________

Balwinder Infante MD



DD:  06/08/2018 9:56:14

DT:  06/08/2018 14:10:26

Job # [id number]

SHERYL

## 2019-03-22 ENCOUNTER — HOSPITAL ENCOUNTER (OUTPATIENT)
Dept: HOSPITAL 42 - RAD | Age: 79
End: 2019-03-22
Payer: MEDICARE

## 2019-03-24 ENCOUNTER — HOSPITAL ENCOUNTER (INPATIENT)
Dept: HOSPITAL 42 - ED | Age: 79
LOS: 3 days | Discharge: HOME | DRG: 291 | End: 2019-03-27
Attending: INTERNAL MEDICINE | Admitting: INTERNAL MEDICINE
Payer: MEDICARE

## 2019-03-24 VITALS — HEART RATE: 71 BPM | BODY MASS INDEX: 23.6 KG/M2

## 2019-03-24 DIAGNOSIS — Z79.01: ICD-10-CM

## 2019-03-24 DIAGNOSIS — N18.9: ICD-10-CM

## 2019-03-24 DIAGNOSIS — I27.20: ICD-10-CM

## 2019-03-24 DIAGNOSIS — K76.1: ICD-10-CM

## 2019-03-24 DIAGNOSIS — I50.23: ICD-10-CM

## 2019-03-24 DIAGNOSIS — Z95.810: ICD-10-CM

## 2019-03-24 DIAGNOSIS — Z79.82: ICD-10-CM

## 2019-03-24 DIAGNOSIS — Z79.899: ICD-10-CM

## 2019-03-24 DIAGNOSIS — K80.20: ICD-10-CM

## 2019-03-24 DIAGNOSIS — I48.0: ICD-10-CM

## 2019-03-24 DIAGNOSIS — I25.10: ICD-10-CM

## 2019-03-24 DIAGNOSIS — I42.0: ICD-10-CM

## 2019-03-24 DIAGNOSIS — Z90.710: ICD-10-CM

## 2019-03-24 DIAGNOSIS — I13.0: Primary | ICD-10-CM

## 2019-03-24 DIAGNOSIS — J20.9: ICD-10-CM

## 2019-03-24 LAB
ALBUMIN SERPL-MCNC: 4.4 G/DL (ref 3–4.8)
ALBUMIN/GLOB SERPL: 1.6 {RATIO} (ref 1.1–1.8)
ALT SERPL-CCNC: 72 U/L (ref 7–56)
APPEARANCE UR: CLEAR
APTT BLD: 36.3 SECONDS (ref 26.9–38.3)
AST SERPL-CCNC: 69 U/L (ref 14–36)
BASOPHILS # BLD AUTO: 0.03 K/MM3 (ref 0–2)
BASOPHILS NFR BLD: 0.6 % (ref 0–3)
BILIRUB UR-MCNC: NEGATIVE MG/DL
BNP SERPL-MCNC: (no result) PG/ML (ref 0–450)
BUN SERPL-MCNC: 43 MG/DL (ref 7–21)
CALCIUM SERPL-MCNC: 9.7 MG/DL (ref 8.4–10.5)
COLOR UR: YELLOW
EOSINOPHIL # BLD: 0.1 10*3/UL (ref 0–0.7)
EOSINOPHIL NFR BLD: 1.5 % (ref 1.5–5)
ERYTHROCYTE [DISTWIDTH] IN BLOOD BY AUTOMATED COUNT: 14.8 % (ref 11.5–14.5)
GFR NON-AFRICAN AMERICAN: 34
GLUCOSE UR STRIP-MCNC: NEGATIVE MG/DL
HGB BLD-MCNC: 13.5 G/DL (ref 12–16)
INR PPP: 2.84
LEUKOCYTE ESTERASE UR-ACNC: NEGATIVE LEU/UL
LYMPHOCYTES # BLD: 0.7 10*3/UL (ref 1.2–3.4)
LYMPHOCYTES NFR BLD AUTO: 13.4 % (ref 22–35)
MCH RBC QN AUTO: 29.6 PG (ref 25–35)
MCHC RBC AUTO-ENTMCNC: 31.8 G/DL (ref 31–37)
MCV RBC AUTO: 93.2 FL (ref 80–105)
MONOCYTES # BLD AUTO: 0.3 10*3/UL (ref 0.1–0.6)
MONOCYTES NFR BLD: 5.7 % (ref 1–6)
PH UR STRIP: 6.5 [PH] (ref 4.7–8)
PLATELET # BLD: 229 10^3/UL (ref 120–450)
PMV BLD AUTO: 10.2 FL (ref 7–11)
PROT UR STRIP-MCNC: NEGATIVE MG/DL
PROTHROMBIN TIME: 32.1 SECONDS (ref 9.4–12.5)
RBC # BLD AUTO: 4.56 10^6/UL (ref 3.5–6.1)
RBC # UR STRIP: NEGATIVE /UL
SP GR UR STRIP: 1.01 (ref 1–1.03)
TROPONIN I SERPL-MCNC: 0.04 NG/ML
UROBILINOGEN UR STRIP-ACNC: 0.2 E.U./DL
WBC # BLD AUTO: 5.4 10^3/UL (ref 4.5–11)

## 2019-03-24 RX ADMIN — SACUBITRIL AND VALSARTAN SCH EACH: 24; 26 TABLET, FILM COATED ORAL at 21:52

## 2019-03-24 RX ADMIN — SACUBITRIL AND VALSARTAN SCH: 24; 26 TABLET, FILM COATED ORAL at 18:11

## 2019-03-24 RX ADMIN — SACUBITRIL AND VALSARTAN SCH EACH: 24; 26 TABLET, FILM COATED ORAL at 12:09

## 2019-03-24 RX ADMIN — METOPROLOL SUCCINATE SCH MG: 25 TABLET, EXTENDED RELEASE ORAL at 12:08

## 2019-03-24 NOTE — ED PDOC
Arrival/HPI





- General


Chief Complaint: Shortness Of Breath


Time Seen by Provider: 03/24/19 08:57


Historian: Patient





- History of Present Illness


Narrative History of Present Illness (Text): 





03/24/19 08:57 


Crystal Roger is a 78 year old female, with a past medical history of CHF, 

dilated cardiomyopathy, pulmonary hypertension, paroxysmal fibrillation, 

generalized hypertension, and a hysterectomy, [facility] upper back pain radiating across the shoulder blades 

bilaterally and shortness of breath since last night. Patient states shortness 

of breath is improved sitting up. Patient also states she feels as if she is 

retaining water. Patient states visiting her PMD two days ago for exact same 

symptoms, who ruled out pneumonia. Patient informs of milrinone bag for 3 years 

which has been discontinued. Patient states she still has her PICC Line. Patient

denies fever, chills, headache, dizziness, chest pain, dyspnea on exertion, 

cough, diaphoresis, abdominal pain, nausea, vomiting, diarrhea, neck pain, or 

any other complaint.





PMD: Dr. Pemberton


Cardiologist: Dr. Rosado 





Time/Duration: 24 hours


Symptom Onset: Sudden


Symptom Course: Unchanged


Activities at Onset: Light


Context: Home





Past Medical History





- Provider Review


Nursing Documentation Reviewed: Yes





- Infectious Disease


Hx of Infectious Diseases: None





- Tetanus Immunization


Tetanus Immunization: Unknown





- Cardiac


Hx Cardiac Disorders: Yes (cad, cardiomyopathy)


Hx Angina: Yes


Hx Cardiac Arrhythmia: Yes (afib)


Hx Congestive Heart Failure: Yes


Hx Internal Defibrillator: Yes (2011)


Hx Pacemaker: Yes (2011)


Hx Peripheral Edema: Yes (ble +1)


Other/Comment: spider veins ble, hypotension





- Pulmonary


Hx Respiratory Disorders: Yes


Hx Pneumonia: Yes


Other/Comment: patricia 15 yrs ago not on home bipap, no sleep apnea at present time 

as per pt





- Neurological


Hx Migraine: Yes





- HEENT


Hx HEENT Disorder: Yes (allergic rhinitis)





- Renal


Hx Renal Disorder: No





- Endocrine/Metabolic


Hx Endocrine Disorders: No





- Hematological/Oncological


Hx Blood Disorders: Yes


Hx Anemia: Yes





- Integumentary


Hx Dermatological Disorder: No





- Musculoskeletal/Rheumatological


Hx Musculoskeletal Disorders: No


Hx Falls: No





- Gastrointestinal


Hx Gastrointestinal Disorders: No





- Genitourinary/Gynecological


Hx Genitourinary Disorders: No





- Psychiatric


Hx Psychophysiologic Disorder: No


Hx Substance Use: No





- Surgical History


Hx Cardiac Catheterization: Yes


Hx Cholecystectomy:  (pt denies)


Hx Hysterectomy: Yes (age 39)





- Anesthesia


Hx Anesthesia: Yes


Hx Anesthesia Reactions: No


Hx Malignant Hyperthermia: No





- Suicidal Assessment


Feels Threatened In Home Enviroment: No





Family/Social History





- Physician Review


Nursing Documentation Reviewed: Yes


Family/Social History: No Known Family HX


Smoking Status: Never Smoked


Hx Alcohol Use: No


Hx Substance Use: No


Hx Substance Use Treatment: No





Allergies/Home Meds


Allergies/Adverse Reactions: 


Allergies





heparin Allergy (Verified 03/24/19 08:42)


   SWELLING


iodine Allergy (Verified 03/24/19 08:42)


   RASH


levofloxacin Allergy (Verified 03/24/19 08:42)


   RASH


sulfur [From Sulfur-8] Allergy (Verified 03/24/19 08:42)


   RASH








Home Medications: 


                                    Home Meds











 Medication  Instructions  Recorded  Confirmed


 


Pantoprazole [Protonix EC Tab] 40 mg PO DAILY 12/30/17 06/08/18


 


Furosemide [Lasix] 40 mg PO QAM 06/07/18 06/08/18


 


Alprazolam [Xanax] 0.25 mg PO DAILY PRN 06/08/18 06/08/18


 


Amiodarone [Cordarone] 100 mg PO DAILY 06/08/18 06/08/18


 


Aspirin [Ecotrin] 81 mg PO DAILY 06/08/18 06/08/18


 


Furosemide [Lasix] 20 mg PO DIN 06/08/18 06/08/18


 


Metoprolol Succinate XL [Toprol XL] 12.5 mg PO BRK 06/08/18 06/08/18


 


Milrinone [Primacor] 1.8 ml IV CONT 06/08/18 06/08/18


 


Sacubitril/Valsartan [Entresto 24 1 each PO BID 06/08/18 06/08/18





mg-26 mg Tablet]   


 


Warfarin [Coumadin] 2.5 mg PO 1800 06/08/18 06/08/18














Review of Systems





- Physician Review


All systems were reviewed & negative as marked: Yes





- Review of Systems


Constitutional: absent: Fevers


Cardiovascular: absent: Chest Pain





Physical Exam





- Physical Exam


Narrative Physical Exam (Text): 





03/24/19 08:57 


Constitutional: No acute distress.


Head: Normocephalic.  Atraumatic.  


Eyes:  PERRL.


ENT:  Hoarse voice. Moist mucous membranes.


Neck:  Supple.


Cardiovascular: Left sided pacemaker and defibrillator. Pitting edema 

bilaterally of lower extremities.


Chest: No tenderness.


Respiratory:  Clear to auscultation bilaterally.


GI:  Soft. Nontender. Nondistended. 


Back:  No CVA tenderness.


Musculoskeletal:  No tenderness.


Skin:  No rash. 


Neurologic:  Alert, no focal deficit. 








Vital Signs











  Pulse Resp BP Pulse Ox


 


 03/24/19 08:48  71  18  114/55 L  100


 


 03/24/19 08:45   18   98











Respiratory Rate: Normal


Appearance: Positive for: Well-Appearing, Non-Toxic, Comfortable


Pain Distress: None


Mental Status: Positive for: Alert and Oriented X 3





Medical Decision Making


ED Course and Treatment: 





03/24/19 08:57 


Impression: Patient is a 78 year old female who presents to the emergency 

department complaining of upper back pain radiating to the shoulder blades 

bilaterally and shortness of breath. Patient informs visiting PMD for exact same

 symptoms 2 days ago. 





Plan:


-- Labs 


-- Chest X-Ray 


-- Duoneb


-- Lasix


-- Reassess and disposition





Prior Visits:


Notes and results from previous visits were reviewed. 





Progress Notes:


03/24/19 09:25


Reviewed EKG, shows: Ventricularly paced rhythm at 70 BPM. No concordant ST 

elevations.





03/24/19 09:35 


Chest X-Ray shows: 


IMPRESSION:


Severe cardiomegaly and mild pulmonary venous congestion.  Small effusions.  

Findings most likely represent developing congestive heart failure.





03/24/19 10:18


Discussed case with Dr. Linares (hospitalist), who accepts patient under his 

service.  








- RAD Interpretation


Radiology Orders: 











03/24/19 09:04


CHEST PORTABLE [RAD] Stat 














- Medication Orders


Current Medication Orders: 














Discontinued Medications





Furosemide (Lasix)  40 mg IVP STAT STA


   Stop: 03/24/19 09:05











- Scribe Statement


The provider has reviewed the documentation as recorded by the Scribe


Michael Richardson 





All medical record entries made by the Scribe were at my direction and 

personally dictated by me. I have reviewed the chart and agree that the record 

accurately reflects my personal performance of the history, physical exam, 

medical decision making, and the department course for this patient. I have also

 personally directed, reviewed, and agree with the discharge instructions and 

disposition. 








Disposition/Present on Arrival





- Present on Arrival


Any Indicators Present on Arrival: No


History of DVT/PE: No


History of Uncontrolled Diabetes: No


Urinary Catheter: No


History of Decub. Ulcer: No


History Surgical Site Infection Following: None





- Disposition


Have Diagnosis and Disposition been Completed?: Yes


Diagnosis: 


 CHF exacerbation





Disposition: HOSPITALIZED


Disposition Time: 10:20


Patient Plan: Admission, Telemetry


Condition: FAIR


Discharge Instructions (ExitCare):  Heart Failure (ED)


Forms:  [facility] (English)

## 2019-03-24 NOTE — CP.PCM.HP
<Shravan Mclaughlin - Last Filed: 19 11:36>





History of Present Illness





- History of Present Illness


History of Present Illness: 


PGY-2 H&P for Dr Linares





Mrs Roger is a 78 year old female with a PMHx of systolic CHF (last known EF 20% 

from ), dilated cardiomyopathy, w/ AICD (last check by Dr Estrada in 2018), 

non-obstructive CAD with no stents, pulmonary HTN, YOGESH (not on home cpap/bipap),

paroxysmal atrial fibrillation on home warfarin, HTN who presents to our ED for 

worsening shortness of breath and mid-thoracic back pain x3 days. She states she

saw her PMD on Friday who believed she might have pneumonia and sent her for a 

CXR. She had the CXR done. Her shortness of breath worsened to the point she 

couldn't lie down flat. She also endorsed a 2lb weight gain in the past 1 week. 

She believes her legs have swollen but is unsure. She denies cough, fever, 

chills, nausea, abdominal pain, dysuria, diarrhea. At baseline she is 

independent of ADL and can walk more than 10 blocks without issues. She is not 

on home oxygen nor home bipap. Of note she had been on home milrinone for 3 yea

rs which was discontinued on 3/3/2019. She still has the PICC line in place. 





PMD: Dr. Pemberton


Cardiologist: Dr. Rosado - also followed by [facility] [place] 

Heart Failure Team





PMHx: systolic CHF (last known EF 20% from ), dilated cardiomyopathy, w/ 

AICD, pulmonary HTN, paroxysmal atrial fibrillation, HTN


PSHx: hysterectomy at age 39, AICD in 


Allergies: sulfa drugs, levaquin, heparin, iodine


Home Meds: EMR reflects accurate home meds as cross-referenced by her home med 

list


FamHx: Father,  of CHF, Mother, passed away from "old age"


SocialHx: lives with daughter. Walks by self - more than 10 blocks, no assistive

devices. Denied alcohol, ciggs, recreational drug use.





Present on Admission





- Present on Admission


Any Indicators Present on Admission: No





Review of Systems





- Review of Systems


All systems: reviewed and no additional remarkable complaints except (as stated 

in HPI)





Past Patient History





- Infectious Disease


Hx of Infectious Diseases: None





- Tetanus Immunizations


Tetanus Immunization: Unknown





- Past Social History


Smoking Status: Never Smoked





- CARDIAC


Hx Cardiac Disorders: Yes (cad, cardiomyopathy)


Hx Angina: Yes


Hx Cardia Arrhythmia: Yes (afib)


Hx Congestive Heart Failure: Yes


Hx Internal Defibrillator: Yes ()


Hx Pacemaker: Yes ()


Hx Peripheral Edema: Yes (ble +1)


Other/Comment: spider veins ble, hypotension





- PULMONARY


Hx Respiratory Disorders: Yes


Hx Pneumonia: Yes


Other/Comment: yogesh 15 yrs ago not on home bipap, no sleep apnea at present time 

as per pt





- NEUROLOGICAL


Hx Migraine: Yes





- HEENT


Hx HEENT Problems: Yes (allergic rhinitis)





- RENAL


Hx Chronic Kidney Disease: No





- ENDOCRINE/METABOLIC


Hx Endocrine Disorders: No





- HEMATOLOGICAL/ONCOLOGICAL


Hx Blood Disorders: Yes


Hx Anemia: Yes





- INTEGUMENTARY


Hx Dermatological Problems: No





- MUSCULOSKELETAL/RHEUMATOLOGICAL


Hx Musculoskeletal Disorders: No


Hx Falls: No





- GASTROINTESTINAL


Hx Gastrointestinal Disorders: No





- GENITOURINARY/GYNECOLOGICAL


Hx Genitourinary Disorders: No





- PSYCHIATRIC


Hx Psychophysiologic Disorder: No


Hx Substance Use: No





- SURGICAL HISTORY


Hx Cardiac Catheterization: Yes


Hx Cholecystectomy:  (pt denies)


Hx Hysterectomy: Yes (age 39)





- ANESTHESIA


Hx Anesthesia: Yes


Hx Anesthesia Reactions: No


Hx Malignant Hyperthermia: No





Meds


Allergies/Adverse Reactions: 


                                    Allergies











Allergy/AdvReac Type Severity Reaction Status Date / Time


 


heparin Allergy  SWELLING Verified 19 08:42


 


iodine Allergy  RASH Verified 19 08:42


 


levofloxacin Allergy  RASH Verified 19 08:42


 


sulfur [From Sulfur-8] Allergy  RASH Verified 19 08:42














Physical Exam





- Constitutional


Appears: Well, Non-toxic, No Acute Distress





- Head Exam


Head Exam: ATRAUMATIC, NORMAL INSPECTION





- Eye Exam


Eye Exam: EOMI, Normal appearance, PERRL.  absent: Scleral icterus





- ENT Exam


ENT Exam: Mucous Membranes Moist





- Neck Exam


Neck exam: Positive for: Full Rom, Normal Inspection.  Negative for: 

Lymphadenopathy





- Respiratory Exam


Respiratory Exam: Rales, NORMAL BREATHING PATTERN.  absent: Decreased Breath 

Sounds, Rhonchi, Wheezes, Stridor





- Cardiovascular Exam


Cardiovascular Exam: REGULAR RHYTHM, +S1, +S2.  absent: Bradycardia, 

Tachycardia, JVD, Systolic Murmur


Additional comments: 


+S3


Paced Rhythm








- GI/Abdominal Exam


GI & Abdominal Exam: Normal Bowel Sounds, Soft.  absent: Distended, Firm, 

Guarding, Tenderness





- Extremities Exam


Extremities exam: Positive for: normal capillary refill, pedal edema, pedal 

pulses present.  Negative for: joint swelling


Additional comments: 


+1 pitting edema LE b/l








- Back Exam


Back exam: NORMAL INSPECTION.  absent: CVA tenderness (L), CVA tenderness (R), 

muscle spasm, rash noted, tenderness, vertebral tenderness





- Neurological Exam


Neurological exam: Alert, Oriented x3





- Psychiatric Exam


Psychiatric exam: Normal Affect, Normal Mood





- Skin


Skin Exam: Normal Color, Warm


Additional comments: 


spider veins LE b/l








Results





- Vital Signs


Recent Vital Signs: 





                                Last Vital Signs











Temp      


 


Pulse  71   19 08:48


 


Resp  18   19 08:48


 


BP  114/55 L  19 09:28


 


Pulse Ox  100   19 08:48














- Labs


Result Diagrams: 


                                 19 09:17





                                 19 09:17


Labs: 





                         Laboratory Results - last 24 hr











  19





  09:17 09:17 09:17


 


WBC  5.4  


 


RBC  4.56  


 


Hgb  13.5  D  


 


Hct  42.5  


 


MCV  93.2  D  


 


MCH  29.6  


 


MCHC  31.8  


 


RDW  14.8 H  


 


Plt Count  229  


 


MPV  10.2  


 


Neut % (Auto)  78.8 H  


 


Lymph % (Auto)  13.4 L  


 


Mono % (Auto)  5.7  


 


Eos % (Auto)  1.5  


 


Baso % (Auto)  0.6  


 


Lymph # (Auto)  0.7 L  


 


Mono # (Auto)  0.3  


 


Eos # (Auto)  0.1  


 


Baso # (Auto)  0.03  


 


Absolute Neuts (auto)  4.29  


 


PT   32.1 H 


 


INR   2.84 


 


APTT   36.3 


 


Sodium    139


 


Potassium    5.2 H


 


Chloride    104


 


Carbon Dioxide    22


 


Anion Gap    17


 


BUN    43 H


 


Creatinine    1.5 H


 


Est GFR ( Amer)    41


 


Est GFR (Non-Af Amer)    34


 


Random Glucose    124 H


 


Calcium    9.7


 


Total Bilirubin    1.2


 


AST    69 H D


 


ALT    72 H


 


Alkaline Phosphatase    191 H D


 


Total Creatine Kinase    102


 


Troponin I    0.04  D


 


NT-Pro-B Natriuret Pep    61882 H


 


Total Protein    7.2


 


Albumin    4.4


 


Globulin    2.8


 


Albumin/Globulin Ratio    1.6














Assessment & Plan





- Assessment and Plan (Free Text)


Plan: 


Mrs Roger is a 78 year old female with a PMHx of systolic CHF (last known EF 20% 

from 2015), dilated cardiomyopathy, w/ AICD (last check by Dr Estrada in 2018), 

non-obstructive CAD with no stents, pulmonary HTN, paroxysmal atrial 

fibrillation on home warfarin, HTN who presents to our ED for worsening 

shortness of breath and mid-thoracic back pain x3 days. Of note, patient was on 

home milrinone for 3 years which was discontinued on 3/3/2019:





#Acute on Chronic CHF Exacerbation


-possibly  to recent discontinuation of her home milrinone drip which she was

on for 3 years


-inpatient admission monitor on telemetry


-consulted cardiology, Dr Rosado


-cxr showed severe cardiomegaly and mild pulmonary venous congestion, small 

effusions, findings most likely represent developing congestive heart failure


-ekg showed ventricularly paced rhythm


-probnp 88250 (double from prior level)


-f/u echo


-strict Is/Os, head of bed 30 degrees, daily weights


-lasix 40mg ivp bid


-continue home meds entrestro 1 tablet bid, toprol xl 12.5mg po qd, aspirin 81mg

po qd





#Paroxysmal Atrial Fibrillation


-ekg showed ventricularly paced rhythm


-INR therapeutic


-continue home warfarin 3mg po qd


-continue home amiodarone 100mg po qd





#NEGRA


-BUN/Cr 43/1.5


-baseline Cr ~1.8


-likely pre-renal from her CHF exacerbation


-we'll continue her entresto which contains an ARB in light of the NEGRA as 

addressing her CHF exacerbation takes priority


-trend for now





#Elevated LFTs


-mild elevations in AST/ALT/Alk Phos


-trend for now





#Hyperkalemia


-mildly elevated at 5.2


-takes entresto at home which can cause hyperkalemia - she was also take home 

potassium tablets


-hold home potassium tablets


-we'll continue her entresto which contains an ARB in light of the hyperkalemia 

as addressing her CHF exacerbation takes priority


-trend for now





#Mid-Thoracic Back Pain, Improved


-improved


-monitor kidney function


-f/u UA





Seen and discussed with Dr Linares





Decision To Admit





- Pt Status Changed To:


Hospital Disposition Of: Inpatient Admission





- Admit Certification


Admit to Inpatient:: After my assessment, the patient will require 

hospitalization for at least two midnights.  This is because of the severity of 

symptoms shown, intensity of services needed, and/or the medical risk in this 

patient being treated as an outpatient.





- .


Bed Request Type: Telemetry





<Dre Linares - Last Filed: 19 13:13>





Results





- Vital Signs


Recent Vital Signs: 





                                Last Vital Signs











Temp      


 


Pulse  70   19 12:09


 


Resp  18   19 11:43


 


BP  100/68   19 12:09


 


Pulse Ox  96   19 11:43














- Labs


Result Diagrams: 


                                 19 09:17





                                 19 09:17


Labs: 





                         Laboratory Results - last 24 hr











  19





  09:17 09:17 09:17


 


WBC  5.4  


 


RBC  4.56  


 


Hgb  13.5  D  


 


Hct  42.5  


 


MCV  93.2  D  


 


MCH  29.6  


 


MCHC  31.8  


 


RDW  14.8 H  


 


Plt Count  229  


 


MPV  10.2  


 


Neut % (Auto)  78.8 H  


 


Lymph % (Auto)  13.4 L  


 


Mono % (Auto)  5.7  


 


Eos % (Auto)  1.5  


 


Baso % (Auto)  0.6  


 


Lymph # (Auto)  0.7 L  


 


Mono # (Auto)  0.3  


 


Eos # (Auto)  0.1  


 


Baso # (Auto)  0.03  


 


Absolute Neuts (auto)  4.29  


 


PT   32.1 H 


 


INR   2.84 


 


APTT   36.3 


 


Sodium    139


 


Potassium    5.2 H


 


Chloride    104


 


Carbon Dioxide    22


 


Anion Gap    17


 


BUN    43 H


 


Creatinine    1.5 H


 


Est GFR ( Amer)    41


 


Est GFR (Non-Af Amer)    34


 


Random Glucose    124 H


 


Calcium    9.7


 


Total Bilirubin    1.2


 


AST    69 H D


 


ALT    72 H


 


Alkaline Phosphatase    191 H D


 


Total Creatine Kinase    102


 


Troponin I    0.04  D


 


NT-Pro-B Natriuret Pep    20703 H


 


Total Protein    7.2


 


Albumin    4.4


 


Globulin    2.8


 


Albumin/Globulin Ratio    1.6


 


Urine Color   


 


Urine Appearance   


 


Urine pH   


 


Ur Specific Gravity   


 


Urine Protein   


 


Urine Glucose (UA)   


 


Urine Ketones   


 


Urine Blood   


 


Urine Nitrate   


 


Urine Bilirubin   


 


Urine Urobilinogen   


 


Ur Leukocyte Esterase   














  19





  12:24


 


WBC 


 


RBC 


 


Hgb 


 


Hct 


 


MCV 


 


MCH 


 


MCHC 


 


RDW 


 


Plt Count 


 


MPV 


 


Neut % (Auto) 


 


Lymph % (Auto) 


 


Mono % (Auto) 


 


Eos % (Auto) 


 


Baso % (Auto) 


 


Lymph # (Auto) 


 


Mono # (Auto) 


 


Eos # (Auto) 


 


Baso # (Auto) 


 


Absolute Neuts (auto) 


 


PT 


 


INR 


 


APTT 


 


Sodium 


 


Potassium 


 


Chloride 


 


Carbon Dioxide 


 


Anion Gap 


 


BUN 


 


Creatinine 


 


Est GFR ( Amer) 


 


Est GFR (Non-Af Amer) 


 


Random Glucose 


 


Calcium 


 


Total Bilirubin 


 


AST 


 


ALT 


 


Alkaline Phosphatase 


 


Total Creatine Kinase 


 


Troponin I 


 


NT-Pro-B Natriuret Pep 


 


Total Protein 


 


Albumin 


 


Globulin 


 


Albumin/Globulin Ratio 


 


Urine Color  Yellow


 


Urine Appearance  Clear


 


Urine pH  6.5


 


Ur Specific Gravity  1.015


 


Urine Protein  Negative


 


Urine Glucose (UA)  Negative


 


Urine Ketones  Negative


 


Urine Blood  Negative


 


Urine Nitrate  Negative


 


Urine Bilirubin  Negative


 


Urine Urobilinogen  0.2


 


Ur Leukocyte Esterase  Negative














Attending/Attestation





- Attestation


I have personally seen and examined this patient.: Yes


I have fully participated in the care of the patient.: Yes


I have reviewed all pertinent clinical information: Yes


Notes (Text): 





19 13:11





Medical record note made by the resident after discussion with my direction and 

input after the patient was personally seen and examined by me. I have reviewed 

the chart and agree that the record accurately reflects by personal performance 

of the history, physical exam, data review, and medical decision-making, in the 

course for the patient. I have also personally directed the plan of care.





78 year old female with PMH dilated cardiomyopathy,, nonobstructive CAD , HLD, 

migraine, , YOGESH ,systolic CHF with EF 19% (2017),Proxysmal AF on amiodrone 

and warfarin, is admitted with CHF exacerbation .





We will start on IV lasix.We will follow up BUN and creatinin.We will get 

cardiology consult and repeat Echo





INR is therapeautic will current dose of warfarin.








Management plan was discussed in detail with patient. Education was provided.

## 2019-03-24 NOTE — RAD
Date of service: 



03/24/2019



HISTORY:

 dyspnea 



COMPARISON:

03/22/2019. 



FINDINGS:

The right PICC line terminates in the SVC. 



LUNGS:

The lungs are well inflated.  There is moderate pulmonary venous 

congestion.  There is atelectasis/scarring in the right lower lobe. 



PLEURA:

Small effusions.  No pneumothorax. 



CARDIOVASCULAR:

Severe cardiomegaly and prominent central vasculature.  Stable 

position of left-sided permanent pacing device.  No aortic 

atherosclerotic calcifications present. 



OSSEOUS STRUCTURES:

Within normal limits for the patient's age.



VISUALIZED UPPER ABDOMEN:

Normal.



OTHER FINDINGS:

None.



IMPRESSION:

Severe cardiomegaly and mild pulmonary venous congestion.  Small 

effusions.  Findings most likely represent developing congestive 

heart failure.

## 2019-03-24 NOTE — CARD
--------------- APPROVED REPORT --------------





Date of service: 03/24/2019



EKG Measurement

Heart [id number]

LA [id number]

[id number]

[id number]

[id number]



<Conclusion>

*** Poor data quality, interpretation may be adversely affected

Electronic ventricular pacemaker

Atrial sensed, V paced with 1:1 capture

## 2019-03-25 LAB
ALBUMIN SERPL-MCNC: 4.2 G/DL (ref 3–4.8)
ALBUMIN/GLOB SERPL: 1.4 {RATIO} (ref 1.1–1.8)
ALT SERPL-CCNC: 155 U/L (ref 7–56)
AST SERPL-CCNC: 174 U/L (ref 14–36)
BASOPHILS # BLD AUTO: 0.03 K/MM3 (ref 0–2)
BASOPHILS NFR BLD: 0.5 % (ref 0–3)
BUN SERPL-MCNC: 53 MG/DL (ref 7–21)
CALCIUM SERPL-MCNC: 9.4 MG/DL (ref 8.4–10.5)
EOSINOPHIL # BLD: 0.1 10*3/UL (ref 0–0.7)
EOSINOPHIL NFR BLD: 1.4 % (ref 1.5–5)
ERYTHROCYTE [DISTWIDTH] IN BLOOD BY AUTOMATED COUNT: 14.7 % (ref 11.5–14.5)
GFR NON-AFRICAN AMERICAN: 31
HDLC SERPL-MCNC: 43 MG/DL (ref 29–60)
HEPATITIS A IGM: NEGATIVE
HEPATITIS B CORE AB: NEGATIVE
HEPATITIS C ANTIBODY: NEGATIVE
HGB BLD-MCNC: 12.9 G/DL (ref 12–16)
INR PPP: 2.88
LDLC SERPL-MCNC: 76 MG/DL (ref 0–129)
LYMPHOCYTES # BLD: 0.7 10*3/UL (ref 1.2–3.4)
LYMPHOCYTES NFR BLD AUTO: 10.8 % (ref 22–35)
MCH RBC QN AUTO: 29.7 PG (ref 25–35)
MCHC RBC AUTO-ENTMCNC: 32.5 G/DL (ref 31–37)
MCV RBC AUTO: 91.3 FL (ref 80–105)
MONOCYTES # BLD AUTO: 0.6 10*3/UL (ref 0.1–0.6)
MONOCYTES NFR BLD: 9.7 % (ref 1–6)
PLATELET # BLD: 221 10^3/UL (ref 120–450)
PMV BLD AUTO: 10 FL (ref 7–11)
PROTHROMBIN TIME: 32.6 SECONDS (ref 9.4–12.5)
RBC # BLD AUTO: 4.35 10^6/UL (ref 3.5–6.1)
WBC # BLD AUTO: 6.4 10^3/UL (ref 4.5–11)

## 2019-03-25 RX ADMIN — SACUBITRIL AND VALSARTAN SCH EACH: 24; 26 TABLET, FILM COATED ORAL at 17:58

## 2019-03-25 RX ADMIN — METOPROLOL SUCCINATE SCH MG: 25 TABLET, EXTENDED RELEASE ORAL at 09:46

## 2019-03-25 RX ADMIN — SACUBITRIL AND VALSARTAN SCH EACH: 24; 26 TABLET, FILM COATED ORAL at 09:47

## 2019-03-25 NOTE — CP.PCM.PN
<Kale Plascencia - Last Filed: 03/25/19 12:54>





Subjective





- Date & Time of Evaluation


Date of Evaluation: 03/25/19


Time of Evaluation: 08:00





- Subjective


Subjective: 





Kale Plascencia PGY1 Medicine Progress Note for Dr. Zhu





Patient seen and examined at bedside this morning. No adverse overnight events. 

Patient denies cp, sob, n/v/d, abdominal pain, fever, chills, fatigue. BP noted 

to be 95/61. She has been trending low in regards to BP and mentions that she 

usually runs low. 





A full 12 point ROS was conducted and unremarkable except as stated above.











Objective





- Vital Signs/Intake and Output


Vital Signs (last 24 hours): 


                                        











Temp Pulse Resp BP Pulse Ox


 


 97.5 F L  80   20   98/64 L  95 


 


 03/25/19 06:00  03/25/19 10:00  03/25/19 06:00  03/25/19 09:47  03/25/19 06:00








Intake and Output: 


                                        











 03/25/19 03/25/19





 06:59 18:59


 


Intake Total 630 


 


Balance 630 














- Medications


Medications: 


                               Current Medications





Albuterol/Ipratropium (Duoneb 3 Mg/0.5 Mg (3 Ml) Ud)  3 ml IH [id number] PRN


   PRN Reason: Shortness of Breath


Amiodarone HCl (Cordarone)  100 mg PO DAILY [facility]


   Last Admin: 03/25/19 09:47 Dose:  100 mg


Aspirin (Ecotrin)  81 mg PO DAILY [facility]


   Last Admin: 03/25/19 09:47 Dose:  81 mg


Furosemide (Lasix)  40 mg IVP BID [facility]


   Last Admin: 03/25/19 09:47 Dose:  40 mg


Metoprolol Succinate (Toprol Xl)  12.5 mg PO BRK [facility]


   Last Admin: 03/25/19 09:46 Dose:  12.5 mg


Sacubitril/Valsartan (Entresto 24 Mg-26 Mg Tablet)  1 each PO BID [facility]


   Last Admin: 03/25/19 09:47 Dose:  1 each


Warfarin Sodium (Coumadin)  3 mg PO 1800 [facility]; Protocol


   Last Admin: 03/24/19 17:55 Dose:  3 mg











- Labs


Labs: 


                                        





                                 03/25/19 06:30 





                                 03/25/19 06:30 





                                        











PT  32.6 SECONDS (9.4-12.5)  H  03/25/19  06:30    


 


INR  2.88   03/25/19  06:30    


 


APTT  36.3 Seconds (26.9-38.3)   03/24/19  09:17    














- Constitutional


Appears: No Acute Distress





- Head Exam


Head Exam: ATRAUMATIC, NORMAL INSPECTION, NORMOCEPHALIC





- Eye Exam


Eye Exam: EOMI, Normal appearance





- ENT Exam


ENT Exam: Mucous Membranes Moist





- Neck Exam


Neck Exam: Full ROM





- Respiratory Exam


Respiratory Exam: Clear to Ausculation Bilateral.  absent: Accessory Muscle Use,

Chest Wall Tenderness, Rales, Rhonchi, Wheezes





- Cardiovascular Exam


Cardiovascular Exam: RRR, +S1, +S2.  absent: JVD, Murmur


Additional comments: 





Paced Rhythm. 





- GI/Abdominal Exam


GI & Abdominal Exam: Soft, Normal Bowel Sounds.  absent: Tenderness





- Extremities Exam


Extremities Exam: Full ROM, Normal Capillary Refill, Normal Inspection.  absent:

Joint Swelling, Pedal Edema, Tenderness





- Neurological Exam


Neurological Exam: Alert, Awake, Oriented x3





- Psychiatric Exam


Psychiatric exam: Normal Affect, Normal Mood





- Skin


Skin Exam: Dry, Intact, Normal Color, Warm





Assessment and Plan





- Assessment and Plan (Free Text)


Assessment: 





Patient is a 78 year old female with a PMHx of systolic CHF (last known EF 20% 

from 2015), dilated cardiomyopathy, w/ AICD (last check by Dr Estrada in 2018), 

non-obstructive CAD with no stents, pulmonary HTN, paroxysmal atrial 

fibrillation on home warfarin, HTN who presented to ED for worsening shortness 

of breath for 3 days duration. Patient was on home milrinone for 3 years which 

was discontinued on 3/3/2019 and has had a PICC line in place for the past 2 

years. Patient admitted for acute on chronic CHF exacerbation in the setting of 

CHF-rEF. 


Plan: 





Acute on Chronic CHF Exacerbation in the setting of CHF-rEF 


-consider 2/2 recent discontinuation of her home milrinone drip which she was on

for past 3 years; discontinued on 3/3/19 


-Discussed with Cardiology (Dr. Rosado) via phone, patient has been doing well

off milrinone drip 


-f/u echo


-c/w lasix 40mg IVP BID; improving lung exam 


-CXR: developing congestive heart failure


-probnp 19644 on admission 


-c/w strict Is/Os, head of bed 30 degrees, daily weights


-continue home meds entrestro 1 tablet bid, toprol xl 12.5mg po qd, aspirin 81mg

po qd





Worsening Transaminitis 


-may be secondary to amiodarone toxicity versus hypoperfusion due to hypotension


-d/w cardiology in regards to amiodarone side effect. As per cardio, patient 

should continue with amiodarone


-uptrending AST/ALT/Alk Phop. Continue to monitor. 


-Abdominal US: cholelithiasis. No evidence of cholecystitis. 


-f/u PICC team evaluation in regards to chronic PICC line for the past 2 years 





NEGRA


-consider pre-renal from CHF exacerbation vs medication induced 


-c/w entresto. As per cardio, c/w entresto as NEGRA is mild and benefits of 

entresto outweighs risks 


-BUN/Cr 53/1.6 


-baseline Cr 1.3 - 1.4  





Paroxysmal Atrial Fibrillation


-ekg showed ventricularly paced rhythm


-INR therapeutic


-continue home warfarin 3mg po qd


-continue home amiodarone 100mg po qd





DVT/GI ppx: not indicated 





Dispo: monitor patient on telemetry. Pending further cardio recs. Continue to 

monitor transaminitis. 





Case was discussed an reviewed with Attending Physician, Dr. Zhu.














<Meg Zhu - Last Filed: 03/25/19 13:23>





Objective





- Vital Signs/Intake and Output


Vital Signs (last 24 hours): 


                                        











Temp Pulse Resp BP Pulse Ox


 


 98.6 F   70   70 H  96/61 L  95 


 


 03/25/19 12:00  03/25/19 12:00  03/25/19 12:00  03/25/19 12:00  03/25/19 06:00








Intake and Output: 


                                        











 03/25/19 03/25/19





 06:59 18:59


 


Intake Total 630 


 


Balance 630 














- Medications


Medications: 


                               Current Medications





Albuterol/Ipratropium (Duoneb 3 Mg/0.5 Mg (3 Ml) Ud)  3 ml IH [id number] PRN


   PRN Reason: Shortness of Breath


Amiodarone HCl (Cordarone)  100 mg PO DAILY [facility]


   Last Admin: 03/25/19 09:47 Dose:  100 mg


Aspirin (Ecotrin)  81 mg PO DAILY [facility]


   Last Admin: 03/25/19 09:47 Dose:  81 mg


Furosemide (Lasix)  40 mg IVP BID [facility]


   Last Admin: 03/25/19 09:47 Dose:  40 mg


Metoprolol Succinate (Toprol Xl)  12.5 mg PO BRK [facility]


   Last Admin: 03/25/19 09:46 Dose:  12.5 mg


Sacubitril/Valsartan (Entresto 24 Mg-26 Mg Tablet)  1 each PO BID [facility]


   Last Admin: 03/25/19 09:47 Dose:  1 each


Warfarin Sodium (Coumadin)  3 mg PO 1800 [facility]; Protocol


   Last Admin: 03/24/19 17:55 Dose:  3 mg











- Labs


Labs: 


                                        





                                 03/25/19 06:30 





                                 03/25/19 06:30 





                                        











PT  32.6 SECONDS (9.4-12.5)  H  03/25/19  06:30    


 


INR  2.88   03/25/19  06:30    


 


APTT  36.3 Seconds (26.9-38.3)   03/24/19  09:17    














Attending/Attestation





- Attestation


I have personally seen and examined this patient.: Yes


I have fully participated in the care of the patient.: Yes


I have reviewed all pertinent clinical information, including history, physical 

exam and plan: Yes


Notes (Text): 





03/25/19 13:18


78 year old female with past medical history of systolic CHF (EF 20%), dilated 

cardiomyopathy s/p AICD, formerly on milrinone which was recently discontinued, 

CAD, paroxysmal afib on coumadin and hypertension who presented with complaint 

of shortness of breath secondary to acute on chronic systolic CHF exacerbation. 

Continue with iv lasix bid.  Cardiology evaluation and echocardiogram are 

pending.  Patient also noted to have mildly elevated creatinine from her b

aseline and transaminitis.  Continue to monitor closely.  US showed 

cholelithiasis without cholecystitis.  Will need to review home medications, 

amiodarone and entresto, with cardiologist.





Meg Zhu MD


Hospitalist.

## 2019-03-25 NOTE — US
Date of service: 



03/25/2019



HISTORY:

elevated LFTs



COMPARISON:

06/06/2014.



TECHNIQUE:

Sonographic evaluation of the abdomen.



FINDINGS:



LIVER:

Measures 18.1 cm.  Patent portal vein. Portal venous flow: 

Hepatopetal. Unremarkable echogenicity of the liver parenchyma. No 

mass. No intrahepatic bile duct dilatation.



GALLBLADDER:

Cholelithiasis. Negative study for gallbladder wall thickening, 

pericholecystic fluid, sonographic Anaya's sign.



COMMON BILE DUCT:

Measures 5.7 mm. No stones. No dilatation.



PANCREAS:

Unremarkable as visualized. No mass. No ductal dilatation.



RIGHT KIDNEY:

Measures 4.3 x 10.4cm.  Normal echogenicity. No calculus, mass, or 

hydronephrosis.



LEFT KIDNEY:

Measures 5.2 x 11.1cm.  Normal echogenicity. No calculus, mass, or 

hydronephrosis.  Incidental upper pole cyst 2.4 by cyst 2.8 cm. 



SPLEEN:

Normal in size and contour. No mass.



AORTA:

No aneurysmal dilatation. 



IVC:

Unremarkable. 



OTHER FINDINGS:

None. 



IMPRESSION:

Cholelithiasis. No sonographic evidence of acute cholecystitis. 



Additional benign and/or incidental findings described above.



No significant interval change compared to the prior examination(s).

## 2019-03-26 LAB
ALBUMIN SERPL-MCNC: 3.7 G/DL (ref 3–4.8)
ALBUMIN/GLOB SERPL: 1.2 {RATIO} (ref 1.1–1.8)
ALT SERPL-CCNC: 205 U/L (ref 7–56)
AST SERPL-CCNC: 197 U/L (ref 14–36)
BASOPHILS # BLD AUTO: 0.03 K/MM3 (ref 0–2)
BASOPHILS NFR BLD: 0.5 % (ref 0–3)
BUN SERPL-MCNC: 58 MG/DL (ref 7–21)
CALCIUM SERPL-MCNC: 9.3 MG/DL (ref 8.4–10.5)
EOSINOPHIL # BLD: 0.1 10*3/UL (ref 0–0.7)
EOSINOPHIL NFR BLD: 1.4 % (ref 1.5–5)
ERYTHROCYTE [DISTWIDTH] IN BLOOD BY AUTOMATED COUNT: 14.7 % (ref 11.5–14.5)
GFR NON-AFRICAN AMERICAN: 31
HGB BLD-MCNC: 12.2 G/DL (ref 12–16)
INR PPP: 2.96
LYMPHOCYTES # BLD: 0.9 10*3/UL (ref 1.2–3.4)
LYMPHOCYTES NFR BLD AUTO: 13.1 % (ref 22–35)
MCH RBC QN AUTO: 29.4 PG (ref 25–35)
MCHC RBC AUTO-ENTMCNC: 32.3 G/DL (ref 31–37)
MCV RBC AUTO: 91.1 FL (ref 80–105)
MONOCYTES # BLD AUTO: 0.6 10*3/UL (ref 0.1–0.6)
MONOCYTES NFR BLD: 9.1 % (ref 1–6)
PLATELET # BLD: 200 10^3/UL (ref 120–450)
PMV BLD AUTO: 9.9 FL (ref 7–11)
PROTHROMBIN TIME: 33.5 SECONDS (ref 9.4–12.5)
RBC # BLD AUTO: 4.15 10^6/UL (ref 3.5–6.1)
WBC # BLD AUTO: 6.6 10^3/UL (ref 4.5–11)

## 2019-03-26 RX ADMIN — SACUBITRIL AND VALSARTAN SCH EACH: 24; 26 TABLET, FILM COATED ORAL at 17:25

## 2019-03-26 RX ADMIN — ANALGESIC BALM PRN APPLIC: 1.74; 4.06 OINTMENT TOPICAL at 17:28

## 2019-03-26 RX ADMIN — SACUBITRIL AND VALSARTAN SCH EACH: 24; 26 TABLET, FILM COATED ORAL at 10:37

## 2019-03-26 RX ADMIN — ANALGESIC BALM PRN APPLIC: 1.74; 4.06 OINTMENT TOPICAL at 21:38

## 2019-03-26 RX ADMIN — METOPROLOL SUCCINATE SCH MG: 25 TABLET, EXTENDED RELEASE ORAL at 10:38

## 2019-03-26 NOTE — CARD
--------------- APPROVED REPORT --------------





Date of service: 03/26/2019



EXAM: Two-dimensional and M-mode echocardiogram with Doppler and 

color Doppler.



INDICATION

Congestive Heart Failure 



2D DIMENSIONS 

Left Atrium (2D)4.9   (1.6-4.0cm)IVSd1.0   (0.7-1.1cm)

LVDd6.7   (3.9-5.9cm)PWd1.0   (0.7-1.1cm)

LVDs6.4   (2.5-4.0cm)FS (%) 4.7   %

LVEF (%)10.0   (>50%)



M-Mode DIMENSIONS 

Aortic Root2.70   (2.2-3.7cm)Aortic Cusp Exc.1.40   (1.5-2.0cm)



Aortic Valve

AoV Peak Usmbexyu628.0cm/Marvin Peak GR.6mmHg



Mitral Valve

MV E Ryoclwpf245.0cm/sMV A Qnpqiutm29.0cm/sE/A ratio1.8



TDI

E/Lateral E'0.0E/Medial E'0.0



Tricuspid Valve

TR Peak Alobrsnn569qf/sRAP BLUCMECC30fxSnNQ Peak Gr.44mmHg

PQEU10phSz



 LEFT VENTRICLE 

The Left Ventricle is severely dilated. There is normal left 

ventricular wall thickness. The ejection fraction is severely 

impaired. There is global hypokinesis of the left ventricle. There is 

dyskinesis of the septal wall.



 RIGHT VENTRICLE 

The right ventricle is mildly dilated. There is an ICD lead in the 

right ventricle.



 ATRIA 

The left atrium is moderately dilated. The right atrium is moderately 

dilated. The inferior vena cava is dilated. The interatrial septum is 

intact with no evidence for an atrial septal defect.



 AORTIC VALVE 

The aortic valve is normal in structure. There is trace aortic 

regurgitation. There is no aortic valvular stenosis. 



 MITRAL VALVE 

Mitral annular calcification is moderate. The mitral valve is mildly 

thickened. Mitral regurgitation is severe.



 TRICUSPID VALVE 

The tricuspid valve is normal in structure. There is severe tricuspid 

regurgitation.



 PULMONIC VALVE 

The pulmonary valve is normal in structure.



 GREAT VESSELS 

The aortic root is normal in size. Dilated IVC with poor inspiration 

collapse is consistent with elevated right atrial pressure.



 PERICARDIAL EFFUSION 

There is no pleural effusion. There is no pericardial effusion.



<Conclusion>

Four chamber enlargement.

Severely reduced LV systolic function.

Global hypokinesis as well as septal dyskinesis.

Severe mitral regurgitation.

Severe tricuspid regurgitation.

ICD lead noted in RV.

## 2019-03-26 NOTE — CP.PCM.PN
<Kale Plascencia - Last Filed: 03/26/19 17:31>





Subjective





- Date & Time of Evaluation


Date of Evaluation: 03/26/19


Time of Evaluation: 08:00





- Subjective


Subjective: 





Kale Plascencia PGY1 Medicine Progress Note for Dr. Zhu





Patient was seen and examined at bedside this morning. Vital signs stable, BP 

remains low around baseline at 91/67. No adverse overnight changes. She says her

shortness of breath has improved. Denies cp, headache, fever, chills, n/v/d, 

fatigue. Patient did mention that she has chronic neck pain and prior to her ad

mission, she was "taking tylenol 2 tablets every 4 hours". 





A full 12 point ROS was conducted and unremarkable except as stated above.











Objective





- Vital Signs/Intake and Output


Vital Signs (last 24 hours): 


                                        











Temp Pulse Resp BP Pulse Ox


 


 98 F   74   20   91/67 L  95 


 


 03/26/19 06:00  03/26/19 06:00  03/26/19 06:00  03/26/19 06:00  03/26/19 06:00








Intake and Output: 


                                        











 03/26/19 03/26/19





 06:59 18:59


 


Intake Total 320 


 


Balance 320 














- Medications


Medications: 


                               Current Medications





Albuterol/Ipratropium (Duoneb 3 Mg/0.5 Mg (3 Ml) Ud)  3 ml IH [id number] PRN


   PRN Reason: Shortness of Breath


Amiodarone HCl (Cordarone)  100 mg PO DAILY On license of [facility]


   Last Admin: 03/25/19 09:47 Dose:  100 mg


Aspirin (Ecotrin)  81 mg PO DAILY On license of [facility]


   Last Admin: 03/25/19 09:47 Dose:  81 mg


Furosemide (Lasix)  40 mg IVP BID On license of [facility]


Metoprolol Succinate (Toprol Xl)  12.5 mg PO BRK On license of [facility]


   Last Admin: 03/25/19 09:46 Dose:  12.5 mg


Sacubitril/Valsartan (Entresto 24 Mg-26 Mg Tablet)  1 each PO BID On license of [facility]


   Last Admin: 03/25/19 17:58 Dose:  1 each


Warfarin Sodium (Coumadin)  3 mg PO 1800 CODY; Protocol


   Last Admin: 03/25/19 17:58 Dose:  3 mg











- Labs


Labs: 


                                        





                                 03/26/19 06:00 





                                 03/26/19 06:00 





                                        











PT  33.5 SECONDS (9.4-12.5)  H  03/26/19  06:00    


 


INR  2.96   03/26/19  06:00    


 


APTT  36.3 Seconds (26.9-38.3)   03/24/19  09:17    








- Constitutional


Appears: No Acute Distress





- Head Exam


Head Exam: ATRAUMATIC, NORMAL INSPECTION, NORMOCEPHALIC





- Eye Exam


Eye Exam: EOMI, Normal appearance





- ENT Exam


ENT Exam: Mucous Membranes Moist





- Neck Exam


Neck Exam: Full ROM





- Respiratory Exam


Respiratory Exam: Clear to Ausculation Bilateral.  absent: Accessory Muscle Use,

Chest Wall Tenderness, Rales, Rhonchi, Wheezes





- Cardiovascular Exam


Cardiovascular Exam: RRR, +S1, +S2.  absent: JVD, Murmur


Additional comments: 





Paced Rhythm. 





- GI/Abdominal Exam


GI & Abdominal Exam: Soft, Normal Bowel Sounds.  absent: Tenderness





- Extremities Exam


Extremities Exam: Full ROM, Normal Capillary Refill, Normal Inspection.  absent:

Joint Swelling, Pedal Edema, Tenderness





- Neurological Exam


Neurological Exam: Alert, Awake, Oriented x3





- Psychiatric Exam


Psychiatric exam: Normal Affect, Normal Mood





- Skin


Skin Exam: Dry, Intact, Normal Color, Warm





Assessment and Plan





- Assessment and Plan (Free Text)


Assessment: 





Patient is a 78 year old female with a PMHx of systolic CHF (last known EF 20% 

from 2015), dilated cardiomyopathy, w/ AICD (last check by Dr Estrada in 2018), 

non-obstructive CAD with no stents, pulmonary HTN, paroxysmal atrial 

fibrillation on home warfarin, HTN who presented to ED for worsening shortness 

of breath for 3 days duration. Patient was on home milrinone for 3 years which 

was discontinued on 3/3/2019 and has had a PICC line in place for the past 2 

years. Patient admitted for acute on chronic CHF exacerbation in the setting of 

CHF-rEF. During hospital course, patient had worsening transaminitis. 


Plan: 





Worsening Transaminitis 


-Consider 2/2 tylenol abuse prior to admission vs amiodarone toxicity 


-Given uptrending LFTs, amiodarone held as per cardio recommendations 


-GI consulted (Dr. Preston). Follow up recs. 


-Abdominal US: cholelithiasis. No evidence of cholecystitis. 





Acute on Chronic CHF Exacerbation in the setting of CHF-rEF - improved 


-on milrinone for past 3 years; discontinued on 3/3/19 


-Cardiology on consult (Dr. Rosado) and recs appreciated.


-Echo: EF 10%. Global hypokinesis as well as septal dyskensis. Severe MR and TR.

AICD present. 


-c/w lasix 40mg IVP BID; lung exam improved since admission


-CXR: developing congestive heart failure


-probnp 83098 on admission 


-c/w strict Is/Os, head of bed 30 degrees, daily weights


-continue home meds entrestro 1 tablet bid, toprol xl 12.5mg po qd, aspirin 81mg

po qd





NEGRA


-consider pre-renal from CHF exacerbation vs medication induced 


-c/w entresto as per cardio recs  


-BUN/Cr 58/1.6 - stable (baseline Cr 1.3 - 1.4)





Paroxysmal Atrial Fibrillation


-INR was 2.96; held warfarmin home med at this time


-held home amiodarone 100mg po qd given transaminitis 





Chronic Neck Pain


-Muculoskeletal in origin


-BenGay for pain; avoid flexeril as it may actually worsen liver function 





DVT/GI ppx: not indicated 


Diet: HHD





Dispo: monitor patient on telemetry. Patient cleared by cardio. Pending 

recommendations from GI for worsening transaminitis.





Case was discussed an reviewed with Attending Physician, Dr. Zhu.








<Meg Zhu - Last Filed: 03/26/19 17:49>





Objective





- Vital Signs/Intake and Output


Vital Signs (last 24 hours): 


                                        











Temp Pulse Resp BP Pulse Ox


 


 97.4 F L  70   20   94/50 L  95 


 


 03/26/19 12:00  03/26/19 14:00  03/26/19 12:00  03/26/19 17:27  03/26/19 06:00








Intake and Output: 


                                        











 03/26/19 03/26/19





 06:59 18:59


 


Intake Total 320 


 


Balance 320 














- Medications


Medications: 


                               Current Medications





Albuterol/Ipratropium (Duoneb 3 Mg/0.5 Mg (3 Ml) Ud)  3 ml IH [id number] PRN


   PRN Reason: Shortness of Breath


Amiodarone HCl (Cordarone)  100 mg PO DAILY On license of [facility]


   Last Admin: 03/25/19 09:47 Dose:  100 mg


Aspirin (Ecotrin)  81 mg PO DAILY On license of [facility]


   Last Admin: 03/26/19 10:38 Dose:  81 mg


Camphor/Menthol (Bengay)  0 gm TOP QID PRN


   PRN Reason: Muscle spasm


   Last Admin: 03/26/19 17:28 Dose:  1 applic


Furosemide (Lasix)  40 mg IVP BID On license of [facility]


   Last Admin: 03/26/19 17:27 Dose:  40 mg


Metoprolol Succinate (Toprol Xl)  12.5 mg PO BRK CODY


   Last Admin: 03/26/19 10:38 Dose:  12.5 mg


Sacubitril/Valsartan (Entresto 24 Mg-26 Mg Tablet)  1 each PO BID On license of [facility]


   Last Admin: 03/26/19 17:25 Dose:  1 each


Warfarin Sodium (Coumadin)  3 mg PO 1800 CODY; Protocol


   Last Admin: 03/25/19 17:58 Dose:  3 mg











- Labs


Labs: 


                                        





                                 03/26/19 06:00 





                                 03/26/19 06:00 





                                        











PT  33.5 SECONDS (9.4-12.5)  H  03/26/19  06:00    


 


INR  2.96   03/26/19  06:00    


 


APTT  36.3 Seconds (26.9-38.3)   03/24/19  09:17    














Attending/Attestation





- Attestation


I have personally seen and examined this patient.: Yes


I have fully participated in the care of the patient.: Yes


I have reviewed all pertinent clinical information, including history, physical 

exam and plan: Yes


Notes (Text): 





03/26/19 17:47


78 year old female with past medical history of systolic CHF (EF 20%), dilated 

cardiomyopathy s/p AICD, formerly on milrinone which was recently discontinued, 

CAD, paroxysmal afib on coumadin and hypertension who presented with complaint 

of shortness of breath secondary to acute on chronic systolic CHF exacerbation. 

Symptoms have improved with iv lasix bid.  Cardiology is following.  

Echocardiogram was reviewed.  CKD is stable.  LFTs however are increasing.  May 

be secondary to medications (amiodarone and tylenol use at home).  Amiodarione 

is now on hold.  GI evaluation is requested.  Continue to monitor closely.  US 

showed cholelithiasis without cholecystitis. 





Meg Zhu MD


Hospitalist.

## 2019-03-26 NOTE — PN
DATE:  03/26/2019



SUBJECTIVE:  The patient was seen lying in bed on telemetry.  She is

feeling better.  She denies any dyspnea at rest.  Her echocardiogram is

pending.  The blood pressure remains low at times.  Her current medications

include amiodarone 100 mg daily, warfarin, DuoNeb inhaler, Ecotrin,

Entresto 24/26 mg b.i.d., Lasix, and Toprol XL 12.5 mg daily.



PHYSICAL EXAMINATION:

GENERAL:  She is an elderly woman who appears comfortable at rest.

VITAL SIGNS:  Blood pressure is 92/66 with a pulse of 74 with a

dual-chamber pacing noted, respirations are 14.  She is afebrile.

HEENT:  No JVD.

CHEST:  Faint rales at the right base.

HEART:  PMI displaced laterally with soft tones noted.

ABDOMEN:  Soft, nontender with normoactive bowel sounds.

EXTREMITIES:  No edema.



DIAGNOSTIC DATA:  Potassium 4.5, BUN and creatinine are 58 and 1.6.  White

count 6.6, hemoglobin and hematocrit are 12.2 and 37.8 with a platelet

count 200,000.  Echocardiogram is pending.



Abdominal ultrasound reveals cholelithiasis and renal cysts on the left.



IMPRESSION:

1.  Decompensated congestive heart failure, acute-on-chronic systolic,

clinically improved, likely exacerbated by recent acute bronchitis.

2.  Dilated cardiomyopathy.

3.  Paroxysmal atrial fibrillation.

4.  Renal insufficiency, acute-on-chronic.

5.  Elevated transaminases with an AST and ALT of 197 and 205, up from 69

and 72 on admission, possibly due to passive congestion, may also be

related to amiodarone utilization.



RECOMMENDATIONS:  Amiodarone will be placed on hold for now.  The rest of

medications will continue unchanged.  Outpatient followup for transaminases

can be planned.  Her echocardiogram will be reviewed later today.



















From a cardiac standpoint, she appears stable for discharge later today

with close outpatient followup.







__________________________________________

Hari Rosado MD





DD:  03/26/2019 7:49:23

DT:  03/26/2019 7:52:12

Job # [id number]

## 2019-03-26 NOTE — CON
DATE:  03/26/2019



HISTORY OF PRESENT ILLNESS:  I saw Mrs. Roger this morning.  She is a

78-year-old white female with past medical history of cardiomyopathy with

AICD, coronary artery disease, pulmonary hypertension, and atrial

fibrillation, who presented with severe shortness of breath.  The patient

also noted some upper back pain radiating across her shoulder and also with

water retention.  She denied any nausea or vomiting, dysphagia, acute

retrosternal chest pain, diarrhea or rectal bleeding.  The patient also

denied any severe problems with the liver in the past.



PAST MEDICAL HISTORY:  Does not include any problems with gallbladder

disease, peptic ulcer, colitis, or GI bleeding.



PHYSICAL EXAMINATION

GENERAL:  At bedside this morning, the patient is awake and alert and

pleasant.  She indicates her shortness of breath has resolved somewhat, but

she is still on medication, is still retaining somewhat, some small degree

of fluid retention.  She denied any nausea, vomiting, abdominal pain,

change in urine color, or rectal bleeding.

VITAL SIGNS:  I reviewed this patient's vital signs.

HEENT:  Noncontributory.

LUNGS:  Crackles at the bases bilaterally up to about one quarter of the

apex.

HEART:  Irregular rhythm.

ABDOMEN:  Soft.  No tenderness elicited.

EXTREMITIES:  Significant for slight degree of fluid retention at the

ankles.



LABORATORY DATA:  Significant for BNP of 24,000 on date of admission. 

Transaminases on date of admission is 69/72 and alk phos 191.  BUN and

creatinine ratio of 43/1.5 at the time of admission.  Note that over the

past 2 days, the transaminases have increased to current AST and ALT ratio

of 197/205 with alk phos of 167.  Bilirubin is within normal limits.  Her

INR is in the range of 2.96.  H and H was measured 12 and 37 with platelet

count of 200.  Her serology is within normal limits.  Review of the

abdominal ultrasound indicates patent portal vein with unremarkable

echogenicity of the liver parenchyma.  No bile duct dilatation.  She does

have evidence of cholelithiasis, but no gallbladder wall thickening,

pericholecystic fluid.  Bile duct is within normal limits.



An echocardiogram was performed this morning, which is significant for

4-chamber enlargement with severely reduced left ventricular systolic

function.  She does have a global hypokinesis, severe mitral and tricuspid

regurg.



ASSESSMENT AND PLAN:  This is a 78-year-old white female, admitted with

severe shortness of breath secondary to a dilated cardiomyopathy. The

patient has a severe valvular dysfunction, and presented with an elevated

BNP.  Based on the patient's clinical history as well as evaluation of

laboratory data, most likely there is a significant degree of hepatic

congestion due to some impaired ejection fraction.  An LDH was ordered

this morning.  I do not feel that this is related to a biliary etiology. 

It will be best to monitor her LFT's over the next couple of days as her

cardiac status starts to improve.







__________________________________________

Flako Christie DO, PhD





DD:  03/26/2019 11:12:10

DT:  03/26/2019 19:25:52

Job # [id number]

SHERYL

## 2019-03-26 NOTE — CON
DATE:  03/25/2019



REQUESTING PHYSICIAN:  Dr. Zhu.



REASON FOR CONSULTATION:  Known cardiomyopathy, dyspnea.



HISTORY OF PRESENT ILLNESS:  This is a 78-year-old woman, well known to me

with a history of longstanding congestive cardiomyopathy and paroxysmal

atrial fibrillation, who presents to the emergency room complaining of

worsening dyspnea for the past 1 to 2 days.  She states she developed

nonproductive cough and wheezing.  She was seen by Dr. Franklin. 

Recently started on antibiotics.  Her symptoms worsened and she was sent

for a chest x-ray.  This did not show any clear evidence of congestive

heart failure or pneumonia.  She was then admitted for evaluation.  She has

a longstanding history of dilated cardiomyopathy with normal coronaries. 

She has had prior ICD in place.  She had been on intravenous milrinone for

sometime, but this has been discontinued for the past several years.



PAST MEDICAL HISTORY:  Notable for the problems mentioned above.



MEDICATIONS AT HOME:  Include Protonix, Lasix 40 mg daily, Xanax,

amiodarone 100 mg daily, Ecotrin, Lasix 20 mg in the evening, metoprolol XL

12.5 mg daily, and Entresto 24/26 mg b.i.d., and morphine.



ALLERGIES:  SHE HAS HAD REACTION TO SULFA, LEVOFLOXACIN, IODINE, AND

HEPARIN.



SOCIAL HISTORY:  She does not smoke or drink.  She is .



FAMILY HISTORY:  Unremarkable for premature heart disease.



REVIEW OF SYSTEMS:  A 10-point review of systems is notable mainly for the

problems mentioned above.



PHYSICAL EXAMINATION:

GENERAL:  She is an elderly woman who appears comfortable at rest.

VITAL SIGNS:  Blood pressure is 98/64 with a pulse of 80 with ventricular

pacing, and respirations are 16.

NECK:  No JVD or bruits.  Carotid upstrokes are 1+ bilaterally.

CHEST:  Reveals few scattered rhonchi with no rales noted.

HEART:  Reveals PMI displaced laterally with paradoxical splitting the

second sound and soft tones noted.  Systolic murmur is present at the lower

left sternal border and apex.

ABDOMEN:  Soft and nontender with normoactive bowel sounds.

EXTREMITIES:  No clubbing, cyanosis, or edema.

SKIN:  Warm and dry.

PSYCHIATRIC:  Normal mood and affect.

NEUROLOGIC:  Alert and oriented x3.  No gross motor or sensory deficits

noted.



DIAGNOSTIC DATA:  Potassium 4.8.  BUN and creatinine 53 and 1.6.  White

count 6.4, hemoglobin and hematocrit are 12.9 and 39.7 with a platelet

count of 221,000.  INR 2.88.  AST and ALT are 174 and 155 respectively. 

Alkaline phosphatase 177.  Electrocardiograms reveals a atrial sensed and

ventricular paced rhythm.  Chest x-ray reveals moderately enlarged cardiac

silhouette with increased vascular markings.



IMPRESSION:

1.  Decompensated congestive heart failure acute on chronic, predominantly

systolic, possibly exacerbated by recent acute bronchitis.

2.  Dilated cardiomyopathy.

3.  Paroxysmal atrial fibrillation.

4.  Status post implantable cardioverter-defibrillator implant.

5.  Elevated transaminases, possibly due to passive congestion, but given

concomitant use of amiodarone possible drug- induced effect may be playing

a role.



RECOMMENDATIONS:  IV Lasix would be continued for now.  Her cardiac medications

will be continued and unchanged.  If her transaminases rise higher,

amiodarone would be withheld.  Bronchodilator therapy should continue. 

Sodium and fluid restriction is planned.  I will continue to follow and

make further recommendations as appropriate.



Thank you for this consultation.







__________________________________________

Hari Rosado MD





DD:  03/25/2019 21:40:27

DT:  03/26/2019 1:46:24

Job # [id number]

MTDD

## 2019-03-27 VITALS — RESPIRATION RATE: 20 BRPM | OXYGEN SATURATION: 95 %

## 2019-03-27 VITALS — SYSTOLIC BLOOD PRESSURE: 87 MMHG | DIASTOLIC BLOOD PRESSURE: 58 MMHG | TEMPERATURE: 97.6 F | HEART RATE: 71 BPM

## 2019-03-27 LAB
ALBUMIN SERPL-MCNC: 3.7 G/DL (ref 3–4.8)
ALBUMIN/GLOB SERPL: 1.2 {RATIO} (ref 1.1–1.8)
ALT SERPL-CCNC: 227 U/L (ref 7–56)
AST SERPL-CCNC: 172 U/L (ref 14–36)
BASOPHILS # BLD AUTO: 0.04 K/MM3 (ref 0–2)
BASOPHILS NFR BLD: 0.7 % (ref 0–3)
BUN SERPL-MCNC: 59 MG/DL (ref 7–21)
CALCIUM SERPL-MCNC: 9.3 MG/DL (ref 8.4–10.5)
EOSINOPHIL # BLD: 0.2 10*3/UL (ref 0–0.7)
EOSINOPHIL NFR BLD: 3 % (ref 1.5–5)
ERYTHROCYTE [DISTWIDTH] IN BLOOD BY AUTOMATED COUNT: 14.7 % (ref 11.5–14.5)
GFR NON-AFRICAN AMERICAN: 34
HGB BLD-MCNC: 12.1 G/DL (ref 12–16)
INR PPP: 2.83
LYMPHOCYTES # BLD: 0.8 10*3/UL (ref 1.2–3.4)
LYMPHOCYTES NFR BLD AUTO: 15.7 % (ref 22–35)
MCH RBC QN AUTO: 29.2 PG (ref 25–35)
MCHC RBC AUTO-ENTMCNC: 32 G/DL (ref 31–37)
MCV RBC AUTO: 91.1 FL (ref 80–105)
MONOCYTES # BLD AUTO: 0.5 10*3/UL (ref 0.1–0.6)
MONOCYTES NFR BLD: 9 % (ref 1–6)
PLATELET # BLD: 201 10^3/UL (ref 120–450)
PMV BLD AUTO: 10 FL (ref 7–11)
PROTHROMBIN TIME: 32 SECONDS (ref 9.4–12.5)
RBC # BLD AUTO: 4.15 10^6/UL (ref 3.5–6.1)
WBC # BLD AUTO: 5.4 10^3/UL (ref 4.5–11)

## 2019-03-27 RX ADMIN — SACUBITRIL AND VALSARTAN SCH EACH: 24; 26 TABLET, FILM COATED ORAL at 09:49

## 2019-03-27 RX ADMIN — METOPROLOL SUCCINATE SCH MG: 25 TABLET, EXTENDED RELEASE ORAL at 08:40

## 2019-03-27 NOTE — CP.PCM.CON
Past Patient History





- Infectious Disease


Hx of Infectious Diseases: None





- Tetanus Immunizations


Tetanus Immunization: Unknown





- Past Social History


Smoking Status: Never Smoked





- CARDIAC


Hx Cardiac Disorders: Yes (CAD, diliated cardiomyopathy, paroxysmal A-fib,)


Hx Congestive Heart Failure: Yes


Hx Hypertension: Yes





- PULMONARY


Hx Respiratory Disorders: Yes


Hx Pneumonia: No


Hx Sleep Apnea: Yes


Other/Comment: patricia 15 yrs ago not on home bipap, no sleep apnea at present time 

as per pt





- NEUROLOGICAL


Hx Neurological Disorder: Yes


Hx Migraine: Yes





- HEENT


Hx HEENT Problems: No





- RENAL


Hx Chronic Kidney Disease: No





- ENDOCRINE/METABOLIC


Hx Endocrine Disorders: No





- HEMATOLOGICAL/ONCOLOGICAL


Hx Blood Disorders: Yes


Hx Anemia: Yes





- INTEGUMENTARY


Hx Dermatological Problems: No





- MUSCULOSKELETAL/RHEUMATOLOGICAL


Hx Musculoskeletal Disorders: No


Hx Falls: No





- GASTROINTESTINAL


Hx Gastrointestinal Disorders: No





- GENITOURINARY/GYNECOLOGICAL


Hx Genitourinary Disorders: No


Other/Comment: Hysterectomy





- PSYCHIATRIC


Hx Psychophysiologic Disorder: No





- SURGICAL HISTORY


Hx Cardiac Catheterization: Yes (No stents)


Hx Cholecystectomy: No (pt denies)


Hx Coronary Stent: No


Hx Hysterectomy: Yes (age 39)





- ANESTHESIA


Hx Anesthesia: Yes


Hx Anesthesia Reactions: No


Hx Malignant Hyperthermia: No





Meds


Allergies/Adverse Reactions: 


                                    Allergies











Allergy/AdvReac Type Severity Reaction Status Date / Time


 


heparin Allergy  SWELLING Verified 03/24/19 08:42


 


iodine Allergy  RASH Verified 03/24/19 08:42


 


levofloxacin Allergy  RASH Verified 03/24/19 08:42


 


sulfur [From Sulfur-8] Allergy  RASH Verified 03/24/19 08:42














- Medications


Medications: 


                               Current Medications





Albuterol/Ipratropium (Duoneb 3 Mg/0.5 Mg (3 Ml) Ud)  3 ml IH [id number] PRN


   PRN Reason: Shortness of Breath


Amiodarone HCl (Cordarone)  100 mg PO DAILY [facility]


   Last Admin: 03/25/19 09:47 Dose:  100 mg


Aspirin (Ecotrin)  81 mg PO DAILY [facility]


   Last Admin: 03/26/19 10:38 Dose:  81 mg


Camphor/Menthol (Bengay)  0 gm TOP QID PRN


   PRN Reason: Muscle spasm


   Last Admin: 03/26/19 21:38 Dose:  1 applic


Furosemide (Lasix)  40 mg IVP BID [facility]


   Last Admin: 03/26/19 17:27 Dose:  40 mg


Metoprolol Succinate (Toprol Xl)  12.5 mg PO BRK [facility]


   Last Admin: 03/26/19 10:38 Dose:  12.5 mg


Sacubitril/Valsartan (Entresto 24 Mg-26 Mg Tablet)  1 each PO BID [facility]


   Last Admin: 03/26/19 17:25 Dose:  1 each


Warfarin Sodium (Coumadin)  3 mg PO 1800 [facility]; Protocol


   Last Admin: 03/25/19 17:58 Dose:  3 mg











Results





- Vital Signs


Recent Vital Signs: 


                                Last Vital Signs











Temp  98 F   03/27/19 05:36


 


Pulse  70   03/27/19 05:36


 


Resp  20   03/27/19 05:36


 


BP  97/63 L  03/27/19 05:36


 


Pulse Ox  95   03/27/19 05:36














- Labs


Result Diagrams: 


                                 03/27/19 05:30





                                 03/27/19 05:30


Labs: 


                         Laboratory Results - last 24 hr











  03/26/19 03/26/19 03/27/19





  07:00 13:00 05:30


 


WBC    5.4


 


RBC    4.15


 


Hgb    12.1


 


Hct    37.8


 


MCV    91.1


 


MCH    29.2


 


MCHC    32.0


 


RDW    14.7 H


 


Plt Count    201


 


MPV    10.0


 


Neut % (Auto)    71.6 H


 


Lymph % (Auto)    15.7 L


 


Mono % (Auto)    9.0 H


 


Eos % (Auto)    3.0


 


Baso % (Auto)    0.7


 


Lymph # (Auto)    0.8 L


 


Mono # (Auto)    0.5


 


Eos # (Auto)    0.2


 


Baso # (Auto)    0.04


 


Absolute Neuts (auto)    3.84


 


PT   


 


INR   


 


Sodium   


 


Potassium   


 


Chloride   


 


Carbon Dioxide   


 


Anion Gap   


 


BUN   


 


Creatinine   


 


Est GFR ( Amer)   


 


Est GFR (Non-Af Amer)   


 


Random Glucose   


 


Calcium   


 


Total Bilirubin   


 


AST   


 


ALT   


 


Alkaline Phosphatase   


 


Lactate Dehydrogenase  1097 H  


 


Total Protein   


 


Albumin   


 


Globulin   


 


Albumin/Globulin Ratio   


 


Acetaminophen   < 10.0 L 














  03/27/19 03/27/19





  05:30 05:30


 


WBC  


 


RBC  


 


Hgb  


 


Hct  


 


MCV  


 


MCH  


 


MCHC  


 


RDW  


 


Plt Count  


 


MPV  


 


Neut % (Auto)  


 


Lymph % (Auto)  


 


Mono % (Auto)  


 


Eos % (Auto)  


 


Baso % (Auto)  


 


Lymph # (Auto)  


 


Mono # (Auto)  


 


Eos # (Auto)  


 


Baso # (Auto)  


 


Absolute Neuts (auto)  


 


PT   32.0 H


 


INR   2.83


 


Sodium  138 


 


Potassium  4.0 


 


Chloride  103 


 


Carbon Dioxide  24 


 


Anion Gap  15 


 


BUN  59 H 


 


Creatinine  1.5 H 


 


Est GFR ( Amer)  41 


 


Est GFR (Non-Af Amer)  34 


 


Random Glucose  100 


 


Calcium  9.3 


 


Total Bilirubin  1.1 


 


AST  172 H 


 


ALT  227 H 


 


Alkaline Phosphatase  174 H 


 


Lactate Dehydrogenase  


 


Total Protein  6.8 


 


Albumin  3.7 


 


Globulin  3.1 


 


Albumin/Globulin Ratio  1.2 


 


Acetaminophen

## 2019-03-27 NOTE — PN
DATE:  03/27/2019



SUBJECTIVE:  The patient is seen lying in bed on telemetry.  She is feeling

comfortable at the present time.  She ambulated on the floor yesterday with

no difficulty.  She was seen by Dr. Christie of the GI service who felt

that her transaminase elevation is likely secondary to passive congestion. 

As a precaution, her amiodarone has been placed on hold.  Current

medications include warfarin, DuoNeb inhaler, Ecotrin, Entresto 24/26 mg

b.i.d., Lasix 40 mg IV b.i.d., Toprol 12.5 mg daily.



PHYSICAL EXAMINATION:

GENERAL:  She is an elderly woman who is comfortable at the present time.

VITAL SIGNS:  Blood pressure 96/60 with pulse of 70 with dual-chamber

pacing, respirations are 14.  She is afebrile.

HEENT:  No JVD.

CHEST:  Few scattered rhonchi.  No rales heard.

HEART:  PMI displaced laterally with a soft systolic murmur at the apex and

soft tones present.

ABDOMEN:  Soft, nontender with normoactive bowel sounds.

EXTREMITIES:  No edema.



DIAGNOSTIC DATA:  Potassium 4 and BUN and creatinine of 59 and 1.5.  White

count 5.4, hemoglobin and hematocrit 12.1 and 37.8 with platelet count of

201,000.  INR is 2.83.  AST and  and 227 with LDH of 174.



IMPRESSION:

1.  Decompensated congestive heart failure, acute-on-chronic, systolic and

now clinically improved.

2.  Recent bronchitis.

3.  Severe dilated cardiomyopathy.

4.  Paroxysmal atrial fibrillation.

5.  Status post implantable cardioverter defibrillator implant.

6.  Renal insufficiency, stable.

7.  Elevated transaminases appear to be more likely secondary to passive

congestion.



RECOMMENDATIONS:  Her current medications will continue for now.  She can

resume her oral Lasix schedule, amiodarone will remain on hold, and her

transaminases will be checked as an outpatient prior to resuming therapy. 

From a cardiac standpoint, she appears stable for discharge home at this

time.  Outpatient followup has been arranged.





__________________________________________

Hari Rosado MD



DD:  03/27/2019 7:23:53

DT:  03/27/2019 7:26:51

Job # [id number]



SHERYL

## 2019-03-27 NOTE — CP.PCM.DIS
<TemoKale - Last Filed: 03/27/19 17:01>





Provider





- Provider


Date of Admission: 


03/24/19 10:20





Attending physician: 


Meg Zhu MD





Consults: 








03/24/19 10:55


Cardiology Consult Routine 


   Comment: 


   Consulting Provider: Hari Rosado


   Consulting Physician: Hari Rosado


   Reason for Consult: CHF exacerbation





03/24/19 13:53


Transition In Care/Readmission Reduction Routine 


   Comment: 


   Physician Instructions: 


   Reason For Exam: CHF EXC





03/26/19 09:20


Physician Consult Routine 


   Comment: 


   Consulting Provider: Lita Preston


   Consulting Physician: Lita Preston


   Reason for Consult: Worsening Transaminitis





03/26/19 16:42


Discharge Planning [Case Management Referral] Routine 


   Comment: 


   Physician Instructions: 


   Reason For Exam: 


   Reason for Referral: [facility] Eval











Time Spent in preparation of Discharge (in minutes): 35





Hospital Course





- Lab Results


Lab Results: 


                             Most Recent Lab Values











WBC  5.4 10^3/uL (4.5-11.0)   03/27/19  05:30    


 


RBC  4.15 10^6/uL (3.5-6.1)   03/27/19  05:30    


 


Hgb  12.1 g/dL (12.0-16.0)   03/27/19  05:30    


 


Hct  37.8 % (36.0-48.0)   03/27/19  05:30    


 


MCV  91.1 fl (80.0-105.0)   03/27/19  05:30    


 


MCH  29.2 pg (25.0-35.0)   03/27/19  05:30    


 


MCHC  32.0 g/dl (31.0-37.0)   03/27/19  05:30    


 


RDW  14.7 % (11.5-14.5)  H  03/27/19  05:30    


 


Plt Count  201 10^3/uL (120.0-450.0)   03/27/19  05:30    


 


MPV  10.0 fl (7.0-11.0)   03/27/19  05:30    


 


Neut % (Auto)  71.6 % (50.0-68.0)  H  03/27/19  05:30    


 


Lymph % (Auto)  15.7 % (22.0-35.0)  L  03/27/19  05:30    


 


Mono % (Auto)  9.0 % (1.0-6.0)  H  03/27/19  05:30    


 


Eos % (Auto)  3.0 % (1.5-5.0)   03/27/19  05:30    


 


Baso % (Auto)  0.7 % (0.0-3.0)   03/27/19  05:30    


 


Lymph # (Auto)  0.8  (1.2-3.4)  L  03/27/19  05:30    


 


Mono # (Auto)  0.5  (0.1-0.6)   03/27/19  05:30    


 


Eos # (Auto)  0.2  (0.0-0.7)   03/27/19  05:30    


 


Baso # (Auto)  0.04 K/mm3 (0.0-2.0)   03/27/19  05:30    


 


Absolute Neuts (auto)  3.84  (1.4-6.5)   03/27/19  05:30    


 


PT  32.0 SECONDS (9.4-12.5)  H  03/27/19  05:30    


 


INR  2.83   03/27/19  05:30    


 


APTT  36.3 Seconds (26.9-38.3)   03/24/19  09:17    


 


Sodium  138 mmol/L (132-148)   03/27/19  05:30    


 


Potassium  4.0 mmol/L (3.6-5.0)   03/27/19  05:30    


 


Chloride  103 mmol/L ()   03/27/19  05:30    


 


Carbon Dioxide  24 mmol/L (21-33)   03/27/19  05:30    


 


Anion Gap  15  (10-20)   03/27/19  05:30    


 


BUN  59 mg/dL (7-21)  H  03/27/19  05:30    


 


Creatinine  1.5 mg/dl (0.7-1.2)  H  03/27/19  05:30    


 


Est GFR ( Amer)  41   03/27/19  05:30    


 


Est GFR (Non-Af Amer)  34   03/27/19  05:30    


 


Random Glucose  100 mg/dL ()   03/27/19  05:30    


 


Calcium  9.3 mg/dL (8.4-10.5)   03/27/19  05:30    


 


Magnesium  2.4 mg/dL (1.7-2.2)  H  03/25/19  06:30    


 


Total Bilirubin  1.1 mg/dL (0.2-1.3)   03/27/19  05:30    


 


AST  172 U/L (14-36)  H  03/27/19  05:30    


 


ALT  227 U/L (7-56)  H  03/27/19  05:30    


 


Alkaline Phosphatase  174 U/L ()  H  03/27/19  05:30    


 


Lactate Dehydrogenase  1097 U/L (333-699)  H  03/26/19  07:00    


 


Total Creatine Kinase  102 U/L ()   03/24/19  09:17    


 


Troponin I  0.04 ng/mL  03/24/19  14:45    


 


NT-Pro-B Natriuret Pep  58142 pg/mL (0-450)  H  03/24/19  09:17    


 


Total Protein  6.8 g/dL (5.8-8.3)   03/27/19  05:30    


 


Albumin  3.7 g/dL (3.0-4.8)   03/27/19  05:30    


 


Globulin  3.1 gm/dL  03/27/19  05:30    


 


Albumin/Globulin Ratio  1.2  (1.1-1.8)   03/27/19  05:30    


 


Triglycerides  93 mg/dL ()   03/25/19  06:30    


 


Cholesterol  147 mg/dL (130-200)   03/25/19  06:30    


 


LDL Cholesterol Direct  76 mg/dL (0-129)   03/25/19  06:30    


 


HDL Cholesterol  43 mg/dL (29-60)   03/25/19  06:30    


 


Urine Color  Yellow  (YELLOW)   03/24/19  12:24    


 


Urine Appearance  Clear  (CLEAR)   03/24/19  12:24    


 


Urine pH  6.5  (4.7-8.0)   03/24/19  12:24    


 


Ur Specific Gravity  1.015  (1.005-1.035)   03/24/19  12:24    


 


Urine Protein  Negative mg/dL (<30 mg/dL)   03/24/19  12:24    


 


Urine Glucose (UA)  Negative mg/dL (NEGATIVE)   03/24/19  12:24    


 


Urine Ketones  Negative mg/dL (NEGATIVE)   03/24/19  12:24    


 


Urine Blood  Negative  (NEGATIVE)   03/24/19  12:24    


 


Urine Nitrate  Negative  (NEGATIVE)   03/24/19  12:24    


 


Urine Bilirubin  Negative  (NEGATIVE)   03/24/19  12:24    


 


Urine Urobilinogen  0.2 E.U./dL (<1 E.U./dL)   03/24/19  12:24    


 


Ur Leukocyte Esterase  Negative Yamile/uL (NEGATIVE)   03/24/19  12:24    


 


Acetaminophen  < 10.0 ug/ml (10.0-20.0)  L  03/26/19  13:00    


 


Hepatitis A IgM Ab  Negative  (NEGATIVE)   03/25/19  11:00    


 


Hep Bs Antigen  Negative  (NEGATIVE)   03/25/19  11:00    


 


Hep B Core IgM Ab  Negative  (NEGATIVE)   03/25/19  11:00    


 


Hepatitis C Antibody  Negative  (NEGATIVE)   03/25/19  11:00    














- Hospital Course


Hospital Course: 





Kale Plascencia, PGY1 Discharge Summary for Dr. Zhu





Patient is a 78 year old female with a PMHx of systolic CHF (last known EF 20% 

from 2015), dilated cardiomyopathy, w/ AICD (last check by Dr Estrada in 2018), 

non-obstructive CAD with no stents, pulmonary HTN, YOGESH (not on home cpap/bipap),

paroxysmal atrial fibrillation on home warfarin, HTN who presented to the ED for

worsening shortness of breath x3 days duration. Patient was admitted for acute 

on chronic CHF exacerbation in the setting of CHF-rEF. She presented with prBNP 

24,500. Her cardiologist, Dr. Rosado was placed on consult.





Echo was ordered by cardiology. Echo showed EF 10%. However, her CHF 

exacerbation improved with administration of lasix 40mg IVP BID. She has been on

milrinone for the past 3 years which was recently discontinued. She also had 

worsening liver enzymes during hospital course, uptrending to 172/227. Abdominal

US only showed cholelithiasis but no acute cholecystitis. GI (Dr. Christie) was 

consulted for worsening transaminitis. The worsening liver function is likely 

due to poor ejection fraction causing hepatic congestion. Other possibility 

includes her home med amiodarone, which is held until she goes back to visit her

cardiologist outpatient. Patient is also asymptomatic on GI exam. Upon reviewing

all labs, imaging, and vitals, patient is hemodynamically stable for discharge. 

She will follow up with outpatient PMD and cardiologist. 





Discharge Exam





- Head Exam


Head Exam: ATRAUMATIC, NORMAL INSPECTION, NORMOCEPHALIC





- Eye Exam


Eye Exam: EOMI, Normal appearance


Pupil Exam: NORMAL ACCOMODATION





- ENT Exam


ENT Exam: Mucous Membranes Moist





- Respiratory Exam


Respiratory Exam: Clear to PA & Lateral.  absent: Accessory Muscle Use, Chest 

Wall Tenderness, Rales, Rhonchi, Wheezes, Respiratory Distress





- Cardiovascular Exam


Cardiovascular Exam: RRR, +S1, +S2





- GI/Abdominal Exam


GI & Abdominal Exam: Normal Bowel Sounds





- Extremities Exam


Extremities exam: normal capillary refill, normal inspection, pedal pulses 

present





- Back Exam


Back exam: NORMAL INSPECTION





- Neurological Exam


Neurological exam: Alert, Oriented x3





- Psychiatric Exam


Psychiatric exam: Normal Affect, Normal Mood





- Skin


Skin Exam: Dry, Intact, Normal Color, Warm





Discharge Plan





- Follow Up Plan


Condition: FAIR


Disposition: HOME/ ROUTINE


Instructions:  Heart Failure and Atrial Fibrillation, Liver Function Test, 

Transaminase Tests, Heart Failure (DC)


Additional Instructions: 


1. Please follow up with your Primary Care Doctor (Dr. Pemberton) within 3-4 days

of discharge from hospital.


   - Inform your Primary Care Doctor that you had elevated liver enzymes 

(AST//227) during your stay in the hospital. 


2. Please follow up with your Cardiologist (Dr. Rosado) within 1 week of 

discharge from hospital.


5. Please do not take your amiodarone medication at this time. Discuss with your

Primary Care Doctor and Cardiologist upon discharge when to resume this 

medication.


3. Do not take your Warfarin medication today. You may resume it starting 

tomorrow, 3/28. Check INR this weekend or Monday.


4. Please resume all other home medications as prescribed. 


6. Please return to the nearest Emergency Department if your symptoms reoccur or

worsen. 











<Meg Zhu - Last Filed: 03/27/19 17:54>





Provider





- Provider


Date of Admission: 


03/24/19 10:20





Attending physician: 


Meg Zhu MD





Consults: 








03/24/19 10:55


Cardiology Consult Routine 


   Comment: 


   Consulting Provider: Hari Rosado


   Consulting Physician: Hari Rosado


   Reason for Consult: CHF exacerbation





03/24/19 13:53


Transition In Care/Readmission Reduction Routine 


   Comment: 


   Physician Instructions: 


   Reason For Exam: CHF EXC





03/26/19 09:20


Physician Consult Routine 


   Comment: 


   Consulting Provider: Lita Preston


   Consulting Physician: Lita Preston


   Reason for Consult: Worsening Transaminitis





03/26/19 16:42


Discharge Planning [Case Management Referral] Routine 


   Comment: 


   Physician Instructions: 


   Reason For Exam: 


   Reason for Referral: [facility] Course





- Lab Results


Lab Results: 


                             Most Recent Lab Values











WBC  5.4 10^3/uL (4.5-11.0)   03/27/19  05:30    


 


RBC  4.15 10^6/uL (3.5-6.1)   03/27/19  05:30    


 


Hgb  12.1 g/dL (12.0-16.0)   03/27/19  05:30    


 


Hct  37.8 % (36.0-48.0)   03/27/19  05:30    


 


MCV  91.1 fl (80.0-105.0)   03/27/19  05:30    


 


MCH  29.2 pg (25.0-35.0)   03/27/19  05:30    


 


MCHC  32.0 g/dl (31.0-37.0)   03/27/19  05:30    


 


RDW  14.7 % (11.5-14.5)  H  03/27/19  05:30    


 


Plt Count  201 10^3/uL (120.0-450.0)   03/27/19  05:30    


 


MPV  10.0 fl (7.0-11.0)   03/27/19  05:30    


 


Neut % (Auto)  71.6 % (50.0-68.0)  H  03/27/19  05:30    


 


Lymph % (Auto)  15.7 % (22.0-35.0)  L  03/27/19  05:30    


 


Mono % (Auto)  9.0 % (1.0-6.0)  H  03/27/19  05:30    


 


Eos % (Auto)  3.0 % (1.5-5.0)   03/27/19  05:30    


 


Baso % (Auto)  0.7 % (0.0-3.0)   03/27/19  05:30    


 


Lymph # (Auto)  0.8  (1.2-3.4)  L  03/27/19  05:30    


 


Mono # (Auto)  0.5  (0.1-0.6)   03/27/19  05:30    


 


Eos # (Auto)  0.2  (0.0-0.7)   03/27/19  05:30    


 


Baso # (Auto)  0.04 K/mm3 (0.0-2.0)   03/27/19  05:30    


 


Absolute Neuts (auto)  3.84  (1.4-6.5)   03/27/19  05:30    


 


PT  32.0 SECONDS (9.4-12.5)  H  03/27/19  05:30    


 


INR  2.83   03/27/19  05:30    


 


APTT  36.3 Seconds (26.9-38.3)   03/24/19  09:17    


 


Sodium  138 mmol/L (132-148)   03/27/19  05:30    


 


Potassium  4.0 mmol/L (3.6-5.0)   03/27/19  05:30    


 


Chloride  103 mmol/L ()   03/27/19  05:30    


 


Carbon Dioxide  24 mmol/L (21-33)   03/27/19  05:30    


 


Anion Gap  15  (10-20)   03/27/19  05:30    


 


BUN  59 mg/dL (7-21)  H  03/27/19  05:30    


 


Creatinine  1.5 mg/dl (0.7-1.2)  H  03/27/19  05:30    


 


Est GFR ( Amer)  41   03/27/19  05:30    


 


Est GFR (Non-Af Amer)  34   03/27/19  05:30    


 


Random Glucose  100 mg/dL ()   03/27/19  05:30    


 


Calcium  9.3 mg/dL (8.4-10.5)   03/27/19  05:30    


 


Magnesium  2.4 mg/dL (1.7-2.2)  H  03/25/19  06:30    


 


Total Bilirubin  1.1 mg/dL (0.2-1.3)   03/27/19  05:30    


 


AST  172 U/L (14-36)  H  03/27/19  05:30    


 


ALT  227 U/L (7-56)  H  03/27/19  05:30    


 


Alkaline Phosphatase  174 U/L ()  H  03/27/19  05:30    


 


Lactate Dehydrogenase  1097 U/L (333-699)  H  03/26/19  07:00    


 


Total Creatine Kinase  102 U/L ()   03/24/19  09:17    


 


Troponin I  0.04 ng/mL  03/24/19  14:45    


 


NT-Pro-B Natriuret Pep  88999 pg/mL (0-450)  H  03/24/19  09:17    


 


Total Protein  6.8 g/dL (5.8-8.3)   03/27/19  05:30    


 


Albumin  3.7 g/dL (3.0-4.8)   03/27/19  05:30    


 


Globulin  3.1 gm/dL  03/27/19  05:30    


 


Albumin/Globulin Ratio  1.2  (1.1-1.8)   03/27/19  05:30    


 


Triglycerides  93 mg/dL ()   03/25/19  06:30    


 


Cholesterol  147 mg/dL (130-200)   03/25/19  06:30    


 


LDL Cholesterol Direct  76 mg/dL (0-129)   03/25/19  06:30    


 


HDL Cholesterol  43 mg/dL (29-60)   03/25/19  06:30    


 


Urine Color  Yellow  (YELLOW)   03/24/19  12:24    


 


Urine Appearance  Clear  (CLEAR)   03/24/19  12:24    


 


Urine pH  6.5  (4.7-8.0)   03/24/19  12:24    


 


Ur Specific Gravity  1.015  (1.005-1.035)   03/24/19  12:24    


 


Urine Protein  Negative mg/dL (<30 mg/dL)   03/24/19  12:24    


 


Urine Glucose (UA)  Negative mg/dL (NEGATIVE)   03/24/19  12:24    


 


Urine Ketones  Negative mg/dL (NEGATIVE)   03/24/19  12:24    


 


Urine Blood  Negative  (NEGATIVE)   03/24/19  12:24    


 


Urine Nitrate  Negative  (NEGATIVE)   03/24/19  12:24    


 


Urine Bilirubin  Negative  (NEGATIVE)   03/24/19  12:24    


 


Urine Urobilinogen  0.2 E.U./dL (<1 E.U./dL)   03/24/19  12:24    


 


Ur Leukocyte Esterase  Negative Yamile/uL (NEGATIVE)   03/24/19  12:24    


 


Acetaminophen  < 10.0 ug/ml (10.0-20.0)  L  03/26/19  13:00    


 


Hepatitis A IgM Ab  Negative  (NEGATIVE)   03/25/19  11:00    


 


Hep Bs Antigen  Negative  (NEGATIVE)   03/25/19  11:00    


 


Hep B Core IgM Ab  Negative  (NEGATIVE)   03/25/19  11:00    


 


Hepatitis C Antibody  Negative  (NEGATIVE)   03/25/19  11:00    














Attending/Attestation





- Attestation


I have personally seen and examined this patient.: Yes


I have fully participated in the care of the patient.: Yes


I have reviewed all pertinent clinical information, including history, physical 

exam and plan: Yes


Notes (Text): 





03/27/19 17:49


78 year old female with past medical history of systolic CHF (EF 20%), dilated 

cardiomyopathy s/p AICD, formerly on milrinone which was recently discontinued, 

CAD, paroxysmal afib on coumadin and hypertension who presented with complaint 

of shortness of breath secondary to acute on chronic systolic CHF exacerbation. 

Symptoms improved with iv lasix bid.  She was being followed by cardiology who 

switched lasix to po today.  CKD is stable. 


She was however noted to have transaminitis possibly secondary to medications 

(amiodarone and tylenol) which were held.  GI evaluation was appreciated.  LFTs 

are beginning to improve.  US showed cholelithiasis without cholecystitis. 





Patient is discharged home to follow up with pmd.


Follow up with cardiology and GI.


Monitor LFTs as outpatient.


Hold amiodarone for now unless resumed by pmd/cardiologist.


Avoid tylenol as LFTs are elevated.


Hold coumadin tonight and resume tomorrow.


Monitor INR as outpatient.





Meg Zhu MD


Hospitalist.

## 2020-02-19 NOTE — ED PDOC
Arrival/HPI





- General


Chief Complaint: Shortness Of Breath


Time Seen by Provider: 17 20:09


Historian: Patient





- History of Present Illness


Narrative History of Present Illness (Text): 


17 20:30





A 77 year old female, whose past medical history includes CHF, dilated 

cardiomyopathy, pulmonary hypertension, paroxysmal fibrillation, generalized 

hypertension, and a hysterectomy, presents to the emergency department for 

shortness of breath and orthopnea PND, which began a couple of days ago.  The 

patient denies any fever, chest pain, cough, chills, or any other complaints at 

this time. 





Time/Duration: < week


Symptom Onset: Sudden


Symptom Course: Unchanged


Activities at Onset: Light


Context: Home





Past Medical History





- Provider Review


Nursing Documentation Reviewed: Yes





- Infectious Disease


Hx of Infectious Diseases: None





- Tetanus Immunization


Tetanus Immunization: Unknown





- Cardiac


Hx Cardiac Disorders: Yes (CHF)


Hx Cardiac Arrhythmia: Yes (AFIB)


Hx Congestive Heart Failure: Yes


Hx Internal Defibrillator: Yes (2 years ago)


Hx Pacemaker: Yes (2 years ago)


Other/Comment: Primacor drip





- Pulmonary


Hx Respiratory Disorders: Yes


Hx Pneumonia: Yes





- Neurological


Hx Neurological Disorder: No





- HEENT


Hx HEENT Disorder: No





- Renal


Hx Renal Disorder: No





- Endocrine/Metabolic


Hx Endocrine Disorders: No





- Hematological/Oncological


Hx Blood Disorders: No


Hx Anemia: Yes (HAD BLOOD TRANFUSION)





- Integumentary


Hx Dermatological Disorder: No





- Musculoskeletal/Rheumatological


Hx Musculoskeletal Disorders: No


Hx Falls: No





- Gastrointestinal


Hx Gastrointestinal Disorders: No





- Genitourinary/Gynecological


Hx Genitourinary Disorders: No





- Psychiatric


Hx Psychophysiologic Disorder: No


Hx Depression: No


Hx Emotional Abuse: No


Hx Physical Abuse: No


Hx Substance Use: No





- Surgical History


Hx Hysterectomy: Yes (AT AGE 39 YEARS AGO)


Other/Comment: HYSTERECTOMY.PACEMAKER,DEFIBRILLATOR





- Anesthesia


Hx Anesthesia: Yes


Hx Anesthesia Reactions: No


Hx Malignant Hyperthermia: No





- Suicidal Assessment


Feels Threatened In Home Enviroment: No





Family/Social History





- Physician Review


Nursing Documentation Reviewed: Yes


Family/Social History: No Known Family HX


Smoking Status: Never Smoked


Hx Alcohol Use: No


Hx Substance Use: No


Hx Substance Use Treatment: No





Allergies/Home Meds


Allergies/Adverse Reactions: 


Allergies





heparin Allergy (Verified 17 20:31)


 SWELLING


iodine Allergy (Verified 17 20:31)


 RASH


levofloxacin Allergy (Verified 17 20:31)


 RASH


sulfur [From Sulfur-8] Allergy (Verified 17 20:31)


 RASH








Home Medications: 


 Home Meds











 Medication  Instructions  Recorded  Confirmed


 


Digoxin 0.125 mg PO DAILY 10/22/12 08/13/17


 


Furosemide 40 mg PO QAM 10/22/12 08/13/17














Review of Systems





- Physician Review


All systems were reviewed & negative as marked: Yes





- Review of Systems


Constitutional: absent: Fevers, Other (chills )


Respiratory: SOB.  absent: Cough


Cardiovascular: absent: Chest Pain





Physical Exam


Vital Signs Reviewed: Yes


Vital Signs











  Temp Pulse Resp BP Pulse Ox


 


 17 22:32   71  18  100/60  96


 


 17 20:59  98.0 F  70  18  99/47 L  96


 


 17 20:40    18   96


 


 17 20:21   89  24  99/47 L  94 L











Temperature: Afebrile


Blood Pressure: Hypotensive


Pulse: Regular


Respiratory Rate: Apneic


Appearance: Positive for: Well-Appearing, Non-Toxic, Comfortable


Pain Distress: None


Mental Status: Positive for: Alert and Oriented X 3





- Systems Exam


Head: Present: Atraumatic, Normocephalic


Pupils: Present: PERRL


Extroacular Muscles: Present: EOMI


Conjunctiva: Present: Normal


Mouth: Present: Moist Mucous Membranes


Neck: Present: Normal Range of Motion


Respiratory/Chest: Present: Good Air Exchange, Rhonchi (few scattered rhonchi 

bilaterally ).  No: Respiratory Distress, Accessory Muscle Use


Cardiovascular: Present: Regular Rate and Rhythm, Normal S1, S2.  No: Murmurs


Abdomen: Present: Normal Bowel Sounds.  No: Tenderness, Distention, Peritoneal 

Signs


Back: Present: Normal Inspection


Upper Extremity: Present: Normal Inspection.  No: Cyanosis, Edema


Lower Extremity: Present: Normal Inspection.  No: Edema


Neurological: Present: GCS=15, CN II-XII Intact, Speech Normal


Skin: Present: Warm, Dry, Normal Color.  No: Rashes


Psychiatric: Present: Alert, Oriented x 3, Normal Insight, Normal Concentration





Medical Decision Making


ED Course and Treatment: 


17 20:44


Impression: A 77 year old female with shortness of breath. 





Plan:





-- EKG


-- Chest X-ray


-- Labs


-- O2 Nasal Cannula 


-- Reassess and disposition





Progress Notes:


17 20:45


EK% Paced Rhythm  





17 21:40


Chest X-ray reviewed, shows cardiomegaly and increased interstitial markings.





17 23:07


Case discussed with Dr. Pemberton, who is aware and agrees with plan. Accepts pt 

in to her service. Pt will go to Telemetry observation for CHF. 





- Lab Interpretations


Lab Results: 








 17 20:48 





 17 20:48 





 Lab Results





17 20:48: Digoxin < 0.4 L


17 20:48: PT 39.1 H, INR 3.50 H, APTT 33.4


17 20:48: Sodium 136, Potassium 4.4, Chloride 103, Carbon Dioxide 22, 

Anion Gap 15, BUN 30 H, Creatinine 1.3 H, Est GFR ( Amer) 48, Est GFR (

Non-Af Amer) 40, Random Glucose 108, Calcium 9.2, Total Bilirubin 0.9, AST 36, 

ALT 35, Alkaline Phosphatase 94, Lactate Dehydrogenase 662, Total Creatine 

Kinase 112, Troponin I 0.04  D, NT-Pro-B Natriuret Pep 09980 H, Total Protein 

7.0, Albumin 4.0, Globulin 3.0, Albumin/Globulin Ratio 1.3


17 20:48: WBC 6.0, RBC 3.92, Hgb 8.4 L, Hct 28.4 L, MCV 72.4 L, MCH 21.4 L

, MCHC 29.6 L, RDW 17.9 H, Plt Count 332, MPV 8.9, Gran % 83.9 H, Lymph % (Auto

) 7.6 L, Mono % (Auto) 6.3 H, Eos % (Auto) 1.5, Baso % (Auto) 0.7, Gran # 5.06, 

Lymph # 0.5 L, Mono # 0.4, Eos # 0.1, Baso # 0.04








I have reviewed the lab results: Yes





- RAD Interpretation


Radiology Orders: 








17 20:34


CHEST PORTABLE [RAD] Stat 











: ED Physician





- EKG Interpretation


Interpreted by ED Physician: Yes


Type: 12 lead EKG





- Medication Orders


Current Medication Orders: 











Discontinued Medications





Albuterol/Ipratropium (Duoneb 3 Mg/0.5 Mg (3 Ml) Ud)  3 ml IH ONCE STA


   Stop: 17 20:56


   Last Admin: 17 20:59  Dose: 3 ml





Furosemide (Lasix)  20 mg IVP ONCE ONE


   Stop: 17 23:02











- Scribe Statement


The provider has reviewed the documentation as recorded by the Scribe


Agustina Soria





Provider Scribe Attestation:


All medical record entries made by the Scribe were at my direction and 

personally dictated by me. I have reviewed the chart and agree that the record 

accurately reflects my personal performance of the history, physical exam, 

medical decision making, and the department course for this patient. I have 

also personally directed, reviewed, and agree with the discharge instructions 

and disposition.








Disposition/Present on Arrival





- Present on Arrival


Any Indicators Present on Arrival: No


History of DVT/PE: No


History of Uncontrolled Diabetes: No


Urinary Catheter: No


History of Decub. Ulcer: No


History Surgical Site Infection Following: None





- Disposition


Have Diagnosis and Disposition been Completed?: Yes


Diagnosis: 


 Congestive heart failure





Disposition: HOSPITALIZED


Disposition Time: 23:02


Patient Plan: Observation


Patient Problems: 


 Current Active Problems











Problem Status Onset


 


Congestive heart failure Acute  











Condition: STABLE


Discharge Instructions (ExitCare):  Heart Failure (ED)


Referrals: 


Shantel Franklin MD [Primary Care Provider] - Follow up with primary


Forms:  [employer] (English) Updated letter for work.  Discussed repeat x-ray in 6-8 weeks; happy to see you back in clinic for this if desired. Or, if preferring to do just an x-ray outside of a clinic visit, contact the clinic (MyChart or phone call) and we can place an order for the x-ray and then contact you with results.